# Patient Record
Sex: MALE | Race: WHITE | NOT HISPANIC OR LATINO
[De-identification: names, ages, dates, MRNs, and addresses within clinical notes are randomized per-mention and may not be internally consistent; named-entity substitution may affect disease eponyms.]

---

## 2019-11-13 ENCOUNTER — FORM ENCOUNTER (OUTPATIENT)
Age: 64
End: 2019-11-13

## 2019-11-14 ENCOUNTER — OUTPATIENT (OUTPATIENT)
Dept: OUTPATIENT SERVICES | Facility: HOSPITAL | Age: 64
LOS: 1 days | End: 2019-11-14
Payer: COMMERCIAL

## 2019-11-14 ENCOUNTER — APPOINTMENT (OUTPATIENT)
Dept: ORTHOPEDIC SURGERY | Facility: CLINIC | Age: 64
End: 2019-11-14
Payer: COMMERCIAL

## 2019-11-14 VITALS — WEIGHT: 138 LBS | BODY MASS INDEX: 25.4 KG/M2 | HEIGHT: 62 IN

## 2019-11-14 DIAGNOSIS — E11.9 TYPE 2 DIABETES MELLITUS W/OUT COMPLICATIONS: ICD-10-CM

## 2019-11-14 DIAGNOSIS — I10 ESSENTIAL (PRIMARY) HYPERTENSION: ICD-10-CM

## 2019-11-14 DIAGNOSIS — Z98.89 OTHER SPECIFIED POSTPROCEDURAL STATES: Chronic | ICD-10-CM

## 2019-11-14 PROCEDURE — 99203 OFFICE O/P NEW LOW 30 MIN: CPT

## 2019-11-14 PROCEDURE — 73564 X-RAY EXAM KNEE 4 OR MORE: CPT | Mod: 26,50

## 2019-11-14 PROCEDURE — 73564 X-RAY EXAM KNEE 4 OR MORE: CPT

## 2019-11-14 RX ORDER — METOPROLOL SUCCINATE 25 MG/1
25 TABLET, EXTENDED RELEASE ORAL
Refills: 0 | Status: ACTIVE | COMMUNITY

## 2019-11-14 RX ORDER — LANSOPRAZOLE 30 MG/1
30 TABLET, ORALLY DISINTEGRATING ORAL
Refills: 0 | Status: ACTIVE | COMMUNITY

## 2019-11-14 RX ORDER — AMLODIPINE BESYLATE 10 MG/1
10 TABLET ORAL
Refills: 0 | Status: ACTIVE | COMMUNITY

## 2019-11-14 RX ORDER — ATORVASTATIN CALCIUM 40 MG/1
40 TABLET, FILM COATED ORAL
Refills: 0 | Status: ACTIVE | COMMUNITY

## 2019-11-18 ENCOUNTER — TRANSCRIPTION ENCOUNTER (OUTPATIENT)
Age: 64
End: 2019-11-18

## 2019-11-18 NOTE — END OF VISIT
[FreeTextEntry3] : All medical record entries made by OLIVIA Gillespie, acting as a scribe for this encounter under the direction of Turner Andrea MD . I have reviewed the chart and agree that the record accurately reflects my personal performance of the history, physical exam, assessment and plan. I have also personally directed, reviewed, and agreed with the chart.

## 2019-11-18 NOTE — DISCUSSION/SUMMARY
[de-identified] : Mr. Dominguez has DJD in both knees.  We will order HA injections for him. He understands that he will ultimately require knee replacement in the future. ALl questions were answered. He will call if any issues arise.

## 2019-11-18 NOTE — PHYSICAL EXAM
[de-identified] : The patient is a well developed, well nourished male in no apparent distress. He is alert and oriented X 3 with a pleasant mood and appropriate affect. \par \par On physical examination of the right knee, his ROM is 0-120 degrees. The patient walks with a normal gait and stands in neutral alignment. There is no effusion. No warmth or erythema is noted. The patella is tender to palpation medially and laterally. There is patellofemoral crepitus noted. The apprehension and grind tests are negative. The extensor mechanism is intact. There is no joint line tenderness. The Chauncey sign is absent. The Lachman and pivot shift tests are negative. There is no varus or valgus laxity at 0 or 30 degrees. No posterolateral or anteromedial laxity is noted. No masses are palpable. No other soft tissue or bony tenderness is noted. There is some tenderness noted on palpation of the IT band. Quadriceps weakness is noted. Neurovascular function is intact.  \par \par On physical examination of the left knee,his ROM is 0-115 degrees range of motion. The patient walks with a normal gait and stands in neutral alignment. There is trace effusion. No warmth or erythema is noted. The patella is tender to palpation medially and laterally. There is patellofemoral crepitus noted. The apprehension and grind tests are negative. The extensor mechanism is intact. There is no joint line tenderness. The Chauncey sign is absent. The Lachman and pivot shift tests are negative. There is no varus or valgus laxity at 0 or 30 degrees. No posterolateral or anteromedial laxity is noted. No masses are palpable. No other soft tissue or bony tenderness is noted. There is some tenderness noted on palpation of the IT band. Quadriceps weakness is noted. Neurovascular function is intact.   [de-identified] : Radiographs shows advanced PF DJD in both knees

## 2019-11-18 NOTE — HISTORY OF PRESENT ILLNESS
[de-identified] : Andrew Dominguez is a 63 yo gentleman with ongoing bilateral knee issues for the last several years. Both knees are bothersome but his left knee is particularly symptomatic. He had open knee surgery at the age of 17 to clean out  bone chips from patella. Over the years, he has noted increased pain and stiffness in his left knee. He now has difficulty with stairs and pain when rising from a seated position. He has anterior knee pain in both knees. He has clicking and intermittent swelling in his left knee. He denies any locking or buckling.

## 2019-12-02 ENCOUNTER — RX RENEWAL (OUTPATIENT)
Age: 64
End: 2019-12-02

## 2019-12-13 ENCOUNTER — APPOINTMENT (OUTPATIENT)
Dept: ORTHOPEDIC SURGERY | Facility: CLINIC | Age: 64
End: 2019-12-13
Payer: COMMERCIAL

## 2019-12-13 PROCEDURE — 20610 DRAIN/INJ JOINT/BURSA W/O US: CPT | Mod: 50

## 2020-03-30 NOTE — PROCEDURE
[de-identified] : Indication: Bilateral knee DJD \par \par Under strict sterile technique, both knees were  prepped with Betadine. Using the superolateral approach, with the patient supine, a 4mL injection of Monovisc was administered intra-articularly into each knee . The patient tolerated the procedure well. The patient was instructed to avoid vigorous exercise for 48 hours and will apply ice to the knee for 20 minutes 2-3 times per day if discomfort occurs. Patient will return on an as needed basis. The patient will call if any questions or problems should arise. \par \par [id number]\par 2021-07-31

## 2020-04-25 ENCOUNTER — MESSAGE (OUTPATIENT)
Age: 65
End: 2020-04-25

## 2020-08-12 ENCOUNTER — APPOINTMENT (OUTPATIENT)
Dept: CHRONIC DISEASE MANAGEMENT | Facility: CLINIC | Age: 65
End: 2020-08-12

## 2020-08-12 VITALS — WEIGHT: 130 LBS | BODY MASS INDEX: 23.92 KG/M2 | HEIGHT: 62 IN

## 2020-09-15 ENCOUNTER — APPOINTMENT (OUTPATIENT)
Dept: CHRONIC DISEASE MANAGEMENT | Facility: CLINIC | Age: 65
End: 2020-09-15

## 2020-09-15 VITALS — WEIGHT: 130 LBS | HEIGHT: 62 IN | BODY MASS INDEX: 23.92 KG/M2

## 2020-10-13 ENCOUNTER — APPOINTMENT (OUTPATIENT)
Dept: CHRONIC DISEASE MANAGEMENT | Facility: CLINIC | Age: 65
End: 2020-10-13

## 2020-10-13 VITALS — HEIGHT: 62 IN | WEIGHT: 127 LBS | BODY MASS INDEX: 23.37 KG/M2

## 2020-10-15 ENCOUNTER — APPOINTMENT (OUTPATIENT)
Dept: ORTHOPEDIC SURGERY | Facility: CLINIC | Age: 65
End: 2020-10-15
Payer: COMMERCIAL

## 2020-10-15 VITALS
HEART RATE: 72 BPM | OXYGEN SATURATION: 98 % | SYSTOLIC BLOOD PRESSURE: 110 MMHG | WEIGHT: 127 LBS | TEMPERATURE: 97.8 F | HEIGHT: 62 IN | DIASTOLIC BLOOD PRESSURE: 70 MMHG | BODY MASS INDEX: 23.37 KG/M2

## 2020-10-15 PROCEDURE — 99213 OFFICE O/P EST LOW 20 MIN: CPT

## 2020-10-16 NOTE — HISTORY OF PRESENT ILLNESS
[de-identified] : Andrew returns today for evaluation of both knees. He has had tremendous relief from the HA injections. He was able to walk 6-8 miles per day during the pandemic. He noted some increased pain and stiffness over the last few weeks. He denies any locking or buckling. He also reports some lateral right hip pain which began after lifting something several weeks ago.

## 2020-10-16 NOTE — DISCUSSION/SUMMARY
[de-identified] : Mr. Dominguez has DJD in both knees.  We will order HA injections for him. He understands that he will ultimately require knee replacement in the future. ALl questions were answered. He will call if any issues arise.

## 2020-10-16 NOTE — END OF VISIT
[FreeTextEntry3] : All medical record entries made by OLIVIA Gillespie, acting as a scribe for this encounter under the direction of Turner Andrea MD . I have reviewed the chart and agree that the record accurately reflects my personal performance of the history, physical exam, assessment and plan. I have also personally directed, reviewed, and agreed with the chart

## 2020-10-16 NOTE — PHYSICAL EXAM
[de-identified] : The patient is a well developed, well nourished male in no apparent distress. He is alert and oriented X 3 with a pleasant mood and appropriate affect. \par \par On physical examination of the right knee, his ROM is 0-120 degrees. The patient walks with a normal gait and stands in neutral alignment. There is no effusion. No warmth or erythema is noted. The patella is tender to palpation medially and laterally. There is patellofemoral crepitus noted. The apprehension and grind tests are negative. The extensor mechanism is intact. There is no joint line tenderness. The Chauncey sign is absent. The Lachman and pivot shift tests are negative. There is no varus or valgus laxity at 0 or 30 degrees. No posterolateral or anteromedial laxity is noted. No masses are palpable. No other soft tissue or bony tenderness is noted. There is some tenderness noted on palpation of the IT band and the lateral hip. Quadriceps weakness is noted. Neurovascular function is intact.  \par \par On physical examination of the left knee,his ROM is 0-115 degrees range of motion. The patient walks with a normal gait and stands in neutral alignment. There is trace effusion. No warmth or erythema is noted. The patella is tender to palpation medially and laterally. There is patellofemoral crepitus noted. The apprehension and grind tests are negative. The extensor mechanism is intact. There is no joint line tenderness. The Chauncey sign is absent. The Lachman and pivot shift tests are negative. There is no varus or valgus laxity at 0 or 30 degrees. No posterolateral or anteromedial laxity is noted. No masses are palpable. No other soft tissue or bony tenderness is noted. There is some tenderness noted on palpation of the IT band. Quadriceps weakness is noted. Neurovascular function is intact.

## 2020-11-05 ENCOUNTER — APPOINTMENT (OUTPATIENT)
Dept: ORTHOPEDIC SURGERY | Facility: CLINIC | Age: 65
End: 2020-11-05
Payer: COMMERCIAL

## 2020-11-05 PROCEDURE — 99072 ADDL SUPL MATRL&STAF TM PHE: CPT

## 2020-11-05 PROCEDURE — 20610 DRAIN/INJ JOINT/BURSA W/O US: CPT | Mod: 50

## 2020-11-05 NOTE — PROCEDURE
[de-identified] : CC: bilateral knee pain and stiffness\par DX; Bilateral knee DJD \par \par Under strict sterile technique, the both knees were prepped with Betadine. Using the superolateral approach, with the patient supine, a 4mL injection of Monovisc was administered intra-articularly. The patient tolerated the procedure well. The patient was instructed to avoid vigorous exercise for 48 hours and will apply ice to the knee for 20 minutes 2-3 times per day if discomfort occurs. Patient will return on an as needed basis. The patient will call if any questions or problems should arise. \par \par Lot [id number]\par 12-31-21

## 2020-11-10 ENCOUNTER — APPOINTMENT (OUTPATIENT)
Dept: CHRONIC DISEASE MANAGEMENT | Facility: CLINIC | Age: 65
End: 2020-11-10

## 2020-11-10 ENCOUNTER — NON-APPOINTMENT (OUTPATIENT)
Age: 65
End: 2020-11-10

## 2020-12-09 ENCOUNTER — NON-APPOINTMENT (OUTPATIENT)
Age: 65
End: 2020-12-09

## 2020-12-09 ENCOUNTER — APPOINTMENT (OUTPATIENT)
Dept: CHRONIC DISEASE MANAGEMENT | Facility: CLINIC | Age: 65
End: 2020-12-09

## 2020-12-09 VITALS — HEIGHT: 62 IN | WEIGHT: 127 LBS | BODY MASS INDEX: 23.37 KG/M2

## 2021-01-12 ENCOUNTER — APPOINTMENT (OUTPATIENT)
Dept: CHRONIC DISEASE MANAGEMENT | Facility: CLINIC | Age: 66
End: 2021-01-12

## 2021-01-12 ENCOUNTER — NON-APPOINTMENT (OUTPATIENT)
Age: 66
End: 2021-01-12

## 2021-01-12 VITALS — WEIGHT: 128 LBS | BODY MASS INDEX: 23.55 KG/M2 | HEIGHT: 62 IN

## 2021-02-16 ENCOUNTER — APPOINTMENT (OUTPATIENT)
Dept: CHRONIC DISEASE MANAGEMENT | Facility: CLINIC | Age: 66
End: 2021-02-16

## 2021-02-16 ENCOUNTER — NON-APPOINTMENT (OUTPATIENT)
Age: 66
End: 2021-02-16

## 2021-02-16 VITALS — WEIGHT: 127 LBS | HEIGHT: 62 IN | BODY MASS INDEX: 23.37 KG/M2

## 2021-03-16 ENCOUNTER — APPOINTMENT (OUTPATIENT)
Dept: CHRONIC DISEASE MANAGEMENT | Facility: CLINIC | Age: 66
End: 2021-03-16

## 2021-03-16 ENCOUNTER — NON-APPOINTMENT (OUTPATIENT)
Age: 66
End: 2021-03-16

## 2021-03-16 VITALS — WEIGHT: 127 LBS | BODY MASS INDEX: 23.37 KG/M2 | HEIGHT: 62 IN

## 2021-04-13 ENCOUNTER — APPOINTMENT (OUTPATIENT)
Dept: CHRONIC DISEASE MANAGEMENT | Facility: CLINIC | Age: 66
End: 2021-04-13

## 2021-04-13 ENCOUNTER — NON-APPOINTMENT (OUTPATIENT)
Age: 66
End: 2021-04-13

## 2021-04-13 VITALS — HEIGHT: 62 IN | WEIGHT: 124 LBS | BODY MASS INDEX: 22.82 KG/M2

## 2021-05-11 ENCOUNTER — NON-APPOINTMENT (OUTPATIENT)
Age: 66
End: 2021-05-11

## 2021-05-11 VITALS — WEIGHT: 124 LBS | HEIGHT: 62 IN | BODY MASS INDEX: 22.82 KG/M2

## 2021-08-19 ENCOUNTER — NON-APPOINTMENT (OUTPATIENT)
Age: 66
End: 2021-08-19

## 2021-09-09 ENCOUNTER — RESULT REVIEW (OUTPATIENT)
Age: 66
End: 2021-09-09

## 2021-09-09 ENCOUNTER — OUTPATIENT (OUTPATIENT)
Dept: OUTPATIENT SERVICES | Facility: HOSPITAL | Age: 66
LOS: 1 days | End: 2021-09-09
Payer: COMMERCIAL

## 2021-09-09 ENCOUNTER — APPOINTMENT (OUTPATIENT)
Dept: ORTHOPEDIC SURGERY | Facility: CLINIC | Age: 66
End: 2021-09-09
Payer: COMMERCIAL

## 2021-09-09 VITALS
BODY MASS INDEX: 22.82 KG/M2 | HEIGHT: 62 IN | WEIGHT: 124 LBS | HEART RATE: 70 BPM | SYSTOLIC BLOOD PRESSURE: 110 MMHG | OXYGEN SATURATION: 98 % | DIASTOLIC BLOOD PRESSURE: 80 MMHG

## 2021-09-09 DIAGNOSIS — S76.111A STRAIN OF RIGHT QUADRICEPS MUSCLE, FASCIA AND TENDON, INITIAL ENCOUNTER: ICD-10-CM

## 2021-09-09 DIAGNOSIS — Z98.89 OTHER SPECIFIED POSTPROCEDURAL STATES: Chronic | ICD-10-CM

## 2021-09-09 PROCEDURE — 73552 X-RAY EXAM OF FEMUR 2/>: CPT

## 2021-09-09 PROCEDURE — 99213 OFFICE O/P EST LOW 20 MIN: CPT

## 2021-09-09 PROCEDURE — 73501 X-RAY EXAM HIP UNI 1 VIEW: CPT

## 2021-09-09 PROCEDURE — 73552 X-RAY EXAM OF FEMUR 2/>: CPT | Mod: 26,RT

## 2021-09-09 PROCEDURE — 73501 X-RAY EXAM HIP UNI 1 VIEW: CPT | Mod: 26,LT

## 2021-09-13 NOTE — END OF VISIT
[FreeTextEntry3] : All medical record entries made by OLIVIA Gillespie, acting as a scribe for this encounter under the direction of Turner Andrea MD . I have reviewed the chart and agree that the record accurately reflects my personal performance of the history, physical exam, assessment and plan. I have also personally directed, reviewed, and agreed with the chart. \par \par

## 2021-09-13 NOTE — PHYSICAL EXAM
[de-identified] : The patient is a well developed, well nourished male in no apparent distress. He is alert and oriented X 3 with a pleasant mood and appropriate affect. \par \par On physical examination of the right knee, his ROM is 0-120 degrees. The patient walks with a normal gait and stands in neutral alignment. There is no effusion. No warmth or erythema is noted. The patella is tender to palpation medially and laterally. There is patellofemoral crepitus noted. The apprehension and grind tests are negative. The extensor mechanism is intact. There is no joint line tenderness. The Chauncey sign is absent. The Lachman and pivot shift tests are negative. There is no varus or valgus laxity at 0 or 30 degrees. No posterolateral or anteromedial laxity is noted. No masses are palpable. No other soft tissue or bony tenderness is noted. There is some tenderness noted on palpation of the IT band and the lateral hip. Quad tightness is noted. Quadriceps weakness is noted. Neurovascular function is intact.  \par \par On physical examination of the left knee,his ROM is 0-115 degrees range of motion. The patient walks with a normal gait and stands in neutral alignment. There is trace effusion. No warmth or erythema is noted. The patella is tender to palpation medially and laterally. There is patellofemoral crepitus noted. The apprehension and grind tests are negative. The extensor mechanism is intact. There is no joint line tenderness. The Chauncey sign is absent. The Lachman and pivot shift tests are negative. There is no varus or valgus laxity at 0 or 30 degrees. No posterolateral or anteromedial laxity is noted. No masses are palpable. No other soft tissue or bony tenderness is noted. There is some tenderness noted on palpation of the IT band. Quadriceps weakness is noted. Neurovascular function is intact.   [de-identified] : Radiographs of both hips show mild DJD \par Radiographs of the femur show no bony abnormalities.

## 2021-09-13 NOTE — HISTORY OF PRESENT ILLNESS
[de-identified] : Andrew returns today for evaluation of his right leg. he reports a recent onset of anterior thigh pain and stiffness. He continues to walk 10,000 steps per day but now with some discomfort. He denies any trauma. he has a h.o DJD in both knees and has managed well in the past with HA Injections.

## 2021-09-13 NOTE — DISCUSSION/SUMMARY
[de-identified] : Andrew has a right quad strain. he will begin a course of supervised PT. we will order HA injections for both knees. He will continue on with activities as tolerated. All questions were answered. He will call if any issues arise.

## 2021-09-30 ENCOUNTER — NON-APPOINTMENT (OUTPATIENT)
Age: 66
End: 2021-09-30

## 2021-09-30 VITALS — WEIGHT: 124 LBS | BODY MASS INDEX: 22.82 KG/M2 | HEIGHT: 62 IN

## 2021-11-01 ENCOUNTER — APPOINTMENT (OUTPATIENT)
Dept: ORTHOPEDIC SURGERY | Facility: CLINIC | Age: 66
End: 2021-11-01
Payer: COMMERCIAL

## 2021-11-01 PROCEDURE — 20610 DRAIN/INJ JOINT/BURSA W/O US: CPT | Mod: 50

## 2021-11-01 NOTE — PROCEDURE
[de-identified] : cc: bilateral knee pain and stiffness\par DX: bilateral knee DJD \par \par Under strict sterile technique, both knees were prepped with Betadine. Using the superolateral approach, with the patient supine, a 4mL injection of Monovisc was administered intra-articularly into each knee. The patient tolerated the procedure well. The patient was instructed to avoid vigorous exercise for 48 hours and will apply ice to the knee for 20 minutes 2-3 times per day if discomfort occurs. Patient will return on an as needed basis. The patient will call if any questions or problems should arise. \par \par Lot [id number]\par 2023-12-31\par \par

## 2021-11-16 ENCOUNTER — APPOINTMENT (OUTPATIENT)
Dept: ORTHOPEDIC SURGERY | Facility: CLINIC | Age: 66
End: 2021-11-16
Payer: COMMERCIAL

## 2021-11-16 VITALS — BODY MASS INDEX: 22.82 KG/M2 | HEIGHT: 62 IN | WEIGHT: 124 LBS

## 2021-11-16 PROCEDURE — 99214 OFFICE O/P EST MOD 30 MIN: CPT

## 2021-11-16 RX ORDER — FAMOTIDINE 40 MG/1
40 TABLET, FILM COATED ORAL
Refills: 0 | Status: ACTIVE | COMMUNITY

## 2021-11-16 NOTE — PHYSICAL EXAM
[de-identified] : General appearance: well nourished and hydrated, pleasant, alert and oriented x 3, cooperative.  \par HEENT: normocephalic, EOM intact, wearing mask, external auditory canal clear.  \par Cardiovascular: no lower leg edema, no varicosities, dorsalis pedis pulses palpable and symmetric.  \par Lymphatics: no palpable lymphadenopathy, no lymphedema.  \par Neurologic: sensation is normal, no muscle weakness in upper or lower extremities, patella tendon reflexes present and symmetric.  \par Dermatologic: skin moist, warm, no rash.  \par Spine: cervical spine with normal lordosis and painless range of motion, thoracic spine with normal kyphosis and painless range of motion, lumbosacral spine with normal lordosis and painless range of motion.  \par Gait: normal.  \par \par Limb lengths: similar\par \par Right hip:\par - Swelling: none\par - Ecchymosis: none\par - Erythema: none\par - Wounds: none\par - Tenderness: none\par - ROM: 110 flexion, 0 extension, 10 adduction, 40 abduction, 5 internal rotation, 60 external rotation\par - CELIA: painful\par - FADIR: painful\par - Belle: negative\par - Stinchfield: positive\par - Flexor power: 5/5\par - Abductor power: 5/5 [de-identified] : Pelvis and left hip XRs taken on 9/9/21 at  [facility] were interpreted by me, and reviewed with the patient.\par \par Pelvic alignment: normal\par \par Right hip --\par Alignment: coxa vara\par Arthritis: severe, nearly bone on bone central\par Deformity: none\par Osteonecrosis: secondary sclerotic and cystic change\par \par Left hip --\par Alignment: coxa vara\par Arthritis: moderate central pattern\par Deformity: none\par Osteonecrosis: secondary sclerotic and cystic change

## 2021-11-16 NOTE — DISCUSSION/SUMMARY
[de-identified] : 65y/o male with right > left hip osteoarthritis\par - He is indicated for right total hip arthroplasty\par - We discussed the details of the procedure, the expected recovery period, and the expected outcome. We discussed the likelihood of satisfaction after complete recovery, and the potential causes of dissatisfaction. The importance of active patient participation in the rehabilitation protocol was emphasized, along with its influence on short and long-term outcomes. We discussed the risks, benefits, and alternatives of surgery at length. Specific risks of total hip replacement were discussed in detail. We discussed the risk of surgical site complications including but not limited to: surgical site infection, wound healing complications, bone fracture, tendon or ligament injury, neurovascular injury, hemorrhage, postoperative stiffness or instability, persistent pain, limb length discrepancy, and need for reoperation. We discussed surgical blood loss and the possible need for blood transfusion. We discussed the risk of perioperative medical complications, including but not limited to catheter-associated urinary tract infection, venous thromboembolism and other cardiopulmonary complications. We discussed anesthetic options and the risk of anesthesia-related complications. We discussed the potential benefits of surgery including the potential to improve the current clinical condition through operative intervention. I emphasized that there are alternatives to surgical intervention including continued conservative management, though such a course could yield less than optimal results in this particular patient. A model was used to demonstrate the operation and to discuss bearing surfaces of the implants. We discussed implant fixation methods; my plan would be to use fully cementless fixation in this case. We discussed the various surgical approaches to the hip; I think that an anterior approach would be appropriate in this case. We discussed the durability of prosthetic hips and limitations related to wear, osteolysis and loosening. All questions were answered to the patient's satisfaction. The patient was given a copy of my preoperative packet with additional information about the procedure. I asked the patient to either call back or schedule a followup appointment for any additional questions or concerns regarding the procedure.\par - He will discuss with his wife and try another course of conservative management before committing to surgery\par - Cont PT\par - HEP\par - Tylenol + meloxicam as needed\par - Discussed CSI but he declined; his cardiologist recommended avoiding corticosteroid in general\par - RTC 2mo, no new XRs needed. Consider booking R NUSRAT at that time pending clinical progress

## 2021-11-16 NOTE — HISTORY OF PRESENT ILLNESS
[___ mths] : [unfilled] month(s) ago [3] : a current pain level of 3/10 [Constant] : ~He/She~ states the symptoms seem to be constant [de-identified] : 11/16/21: 65y/o male p/w R >> L hip pain progressive for about 3 months. No injury or other inciting event. Pain localizes to the groin with radiation down the medial thigh and around the lateral hip. Worst exacerbating activity is rising from seated position. Walking is not too bad after getting started; today he walked here from [facility] without too much difficulty. Started PT in September and hasn't found it particularly helpful. Takes no pain medications. \par \par PMH sig for HTN, CAD s/p MI in 2010 managed with 1x stent, DM (last known A1c 5.9). 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 [None] : No relieving factors are noted [de-identified] : patient describes pain as radiating, sharp and achy [de-identified] : standing up

## 2021-12-07 ENCOUNTER — APPOINTMENT (OUTPATIENT)
Dept: ORTHOPEDIC SURGERY | Facility: CLINIC | Age: 66
End: 2021-12-07
Payer: COMMERCIAL

## 2021-12-07 PROCEDURE — 99214 OFFICE O/P EST MOD 30 MIN: CPT

## 2021-12-07 NOTE — HISTORY OF PRESENT ILLNESS
[de-identified] : 12/7/21: Returning with wife for re-evaluation. No improvement in right hip symptoms with PT. Cardiologist forbade NSAIDs, more due to CKD than CAD. CKD workup is ongoing; no identifiable cause noted so far. They report generally poor function with daily steps now around 3k, sharp spikes of pain somewhat unpredictably, and overall unacceptable quality of life. Would like to book NUSRAT as soon as possible.\par \par 11/16/21: 67y/o male p/w R >> L hip pain progressive for about 3 months. No injury or other inciting event. Pain localizes to the groin with radiation down the medial thigh and around the lateral hip. Worst exacerbating activity is rising from seated position. Walking is not too bad after getting started; today he walked here from [facility] without too much difficulty. Started PT in September and hasn't found it particularly helpful. Takes no pain medications. \par \par PMH sig for HTN, CAD s/p MI in 2010 managed with 1x stent, DM (last known A1c 5.9). 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

## 2021-12-07 NOTE — DISCUSSION/SUMMARY
[de-identified] : 65y/o male with right > left hip osteoarthritis\par - He is indicated for right total hip arthroplasty\par - We discussed the details of the procedure, the expected recovery period, and the expected outcome. We discussed the likelihood of satisfaction after complete recovery, and the potential causes of dissatisfaction. The importance of active patient participation in the rehabilitation protocol was emphasized, along with its influence on short and long-term outcomes. We discussed the risks, benefits, and alternatives of surgery at length. Specific risks of total hip replacement were discussed in detail. We discussed the risk of surgical site complications including but not limited to: surgical site infection, wound healing complications, bone fracture, tendon or ligament injury, neurovascular injury, hemorrhage, postoperative stiffness or instability, persistent pain, limb length discrepancy, and need for reoperation. We discussed surgical blood loss and the possible need for blood transfusion. We discussed the risk of perioperative medical complications, including but not limited to catheter-associated urinary tract infection, venous thromboembolism and other cardiopulmonary complications. We discussed anesthetic options and the risk of anesthesia-related complications. We discussed the potential benefits of surgery including the potential to improve the current clinical condition through operative intervention. I emphasized that there are alternatives to surgical intervention including continued conservative management, though such a course could yield less than optimal results in this particular patient. A model was used to demonstrate the operation and to discuss bearing surfaces of the implants. We discussed implant fixation methods; my plan would be to use fully cementless fixation in this case. We discussed the various surgical approaches to the hip; I think that an anterior approach would be appropriate in this case. We discussed the durability of prosthetic hips and limitations related to wear, osteolysis and loosening. All questions were answered to the patient's satisfaction. The patient was given a copy of my preoperative packet with additional information about the procedure. I asked the patient to either call back or schedule a followup appointment for any additional questions or concerns regarding the procedure.\par - Book for a convenient time, ASAP per their preference\par - Med/cardiac clearance through PCP\par - No NSAIDs perioperatively

## 2021-12-07 NOTE — PHYSICAL EXAM
[de-identified] : General appearance: well nourished and hydrated, pleasant, alert and oriented x 3, cooperative.  \par HEENT: normocephalic, EOM intact, wearing mask, external auditory canal clear.  \par Cardiovascular: no lower leg edema, no varicosities, dorsalis pedis pulses palpable and symmetric.  \par Lymphatics: no palpable lymphadenopathy, no lymphedema.  \par Neurologic: sensation is normal, no muscle weakness in upper or lower extremities, patella tendon reflexes present and symmetric.  \par Dermatologic: skin moist, warm, no rash.  \par Spine: cervical spine with normal lordosis and painless range of motion, thoracic spine with normal kyphosis and painless range of motion, lumbosacral spine with normal lordosis and painless range of motion.  \par Gait: normal.  \par \par Limb lengths: similar\par \par Right hip:\par - Swelling: none\par - Ecchymosis: none\par - Erythema: none\par - Wounds: none\par - Tenderness: none\par - ROM: 110 flexion, 0 extension, 10 adduction, 40 abduction, 5 internal rotation, 60 external rotation\par - CELIA: painful\par - FADIR: painful\par - Belle: negative\par - Stinchfield: positive\par - Flexor power: 5/5\par - Abductor power: 5/5

## 2021-12-13 ENCOUNTER — LABORATORY RESULT (OUTPATIENT)
Age: 66
End: 2021-12-13

## 2021-12-15 ENCOUNTER — TRANSCRIPTION ENCOUNTER (OUTPATIENT)
Age: 66
End: 2021-12-15

## 2021-12-15 VITALS
TEMPERATURE: 98 F | RESPIRATION RATE: 18 BRPM | SYSTOLIC BLOOD PRESSURE: 145 MMHG | HEART RATE: 70 BPM | WEIGHT: 128.75 LBS | OXYGEN SATURATION: 98 % | DIASTOLIC BLOOD PRESSURE: 77 MMHG | HEIGHT: 62 IN

## 2021-12-15 RX ORDER — POVIDONE-IODINE 5 %
1 AEROSOL (ML) TOPICAL ONCE
Refills: 0 | Status: COMPLETED | OUTPATIENT
Start: 2021-12-16 | End: 2021-12-16

## 2021-12-15 RX ORDER — CHLORHEXIDINE GLUCONATE 213 G/1000ML
1 SOLUTION TOPICAL EVERY 12 HOURS
Refills: 0 | Status: COMPLETED | OUTPATIENT
Start: 2021-12-16 | End: 2021-12-17

## 2021-12-15 NOTE — H&P ADULT - NSHPLABSRESULTS_GEN_ALL_CORE
Preop CBC, BMP, PT/PTT/INR, UA within normal limits- reviewed by medical clearance.   Cr 1.8: preop   Echo: 60-65% EF  COVID negative 12/13   CXR: Within normal limits, per medical clearance.

## 2021-12-15 NOTE — H&P ADULT - PROBLEM SELECTOR PLAN 1
Admit to Orthopaedic Service.  Presents today for elective right NUSRAT   Pt medically stable and cleared for procedure today by Dr. Chandra  # Hx CAD  Continue outpatient treatment regimen   #Hx HTN  Continue outpatient treatment regimen   # Hx GERD  Continue outpatient treatment regimen   # PTCA  Continue outpatient treatment regimen   # IBS   Continue outpatient treatment regimen   # Colon Polyp  Continue outpatient treatment regimen   # HLD- Continue outpatient treatment regimen   # Hx Retinal detachment  # DM2- ISS, consistent carb diet, will monitor FS   # MI- Continue outpatient treatment and monitor

## 2021-12-15 NOTE — PRE-OP CHECKLIST - 1.
patient has cardiac stentx1. the surgeon instructed patient to take Baby [id number] today and not tomorrow. patient has cardiac stentx1. the surgeon instructed patient to take Baby [id number] today 12/15 and not tomorrow 12/16

## 2021-12-15 NOTE — H&P ADULT - HISTORY OF PRESENT ILLNESS
66M with right hip pain x    Presents today for right NUSRAT.  66M with right hip pain radiating to his thigh x 6 months.  Pt states pain began slowly and progressed denying any precipitating event. Pt takes no medications for the pain . Pt ambulates independently without assistive device. Pt has attempted and failed conservative treatment for his right hip pain consisting of PT.  Pt has history of 1x cardiac stent for which he takes daily asa. Pt denies numbness and tingling down lower extremities b/l, fever, chills, recent illness, CP, SOB, N/V, or any other complaints.     Presents today for right NUSRAT.

## 2021-12-15 NOTE — H&P ADULT - NSHPPHYSICALEXAM_GEN_ALL_CORE
PE: Decreased ROM to right hip pain       Rest of PE per medical clearance General: Alert and oriented, NAD  MSK:  Decreased ROM of right hip secondary to pain.  EHL/FHL/TA/Gastro 5/5 b/l   DP's palpable   Gross sensation to light touch intact throughout lower extremities b/l       Rest of PE per medical clearance

## 2021-12-15 NOTE — PATIENT PROFILE ADULT - FALL HARM RISK - UNIVERSAL INTERVENTIONS
Bed in lowest position, wheels locked, appropriate side rails in place/Call bell, personal items and telephone in reach/Instruct patient to call for assistance before getting out of bed or chair/Non-slip footwear when patient is out of bed/[place] to call system/Physically safe environment - no spills, clutter or unnecessary equipment/Purposeful Proactive Rounding/Room/bathroom lighting operational, light cord in reach

## 2021-12-16 ENCOUNTER — NON-APPOINTMENT (OUTPATIENT)
Age: 66
End: 2021-12-16

## 2021-12-16 ENCOUNTER — TRANSCRIPTION ENCOUNTER (OUTPATIENT)
Age: 66
End: 2021-12-16

## 2021-12-16 ENCOUNTER — INPATIENT (INPATIENT)
Facility: HOSPITAL | Age: 66
LOS: 2 days | Discharge: HOME | DRG: 470 | End: 2021-12-19
Attending: ORTHOPAEDIC SURGERY | Admitting: ORTHOPAEDIC SURGERY
Payer: COMMERCIAL

## 2021-12-16 ENCOUNTER — APPOINTMENT (OUTPATIENT)
Dept: ORTHOPEDIC SURGERY | Facility: HOSPITAL | Age: 66
End: 2021-12-16

## 2021-12-16 DIAGNOSIS — Z98.890 OTHER SPECIFIED POSTPROCEDURAL STATES: Chronic | ICD-10-CM

## 2021-12-16 DIAGNOSIS — M16.11 UNILATERAL PRIMARY OSTEOARTHRITIS, RIGHT HIP: ICD-10-CM

## 2021-12-16 DIAGNOSIS — Z98.89 OTHER SPECIFIED POSTPROCEDURAL STATES: Chronic | ICD-10-CM

## 2021-12-16 DIAGNOSIS — Z95.5 PRESENCE OF CORONARY ANGIOPLASTY IMPLANT AND GRAFT: Chronic | ICD-10-CM

## 2021-12-16 DIAGNOSIS — Q30.9 CONGENITAL MALFORMATION OF NOSE, UNSPECIFIED: Chronic | ICD-10-CM

## 2021-12-16 LAB
GLUCOSE BLDC GLUCOMTR-MCNC: 134 MG/DL — HIGH (ref 70–99)
GLUCOSE BLDC GLUCOMTR-MCNC: 153 MG/DL — HIGH (ref 70–99)
GLUCOSE BLDC GLUCOMTR-MCNC: 183 MG/DL — HIGH (ref 70–99)
GLUCOSE BLDC GLUCOMTR-MCNC: 196 MG/DL — HIGH (ref 70–99)

## 2021-12-16 PROCEDURE — 27130 TOTAL HIP ARTHROPLASTY: CPT | Mod: RT

## 2021-12-16 PROCEDURE — 72170 X-RAY EXAM OF PELVIS: CPT | Mod: 26

## 2021-12-16 RX ORDER — METFORMIN HYDROCHLORIDE 850 MG/1
500 TABLET ORAL
Refills: 0 | Status: DISCONTINUED | OUTPATIENT
Start: 2021-12-16 | End: 2021-12-19

## 2021-12-16 RX ORDER — FAMOTIDINE 10 MG/ML
40 INJECTION INTRAVENOUS AT BEDTIME
Refills: 0 | Status: DISCONTINUED | OUTPATIENT
Start: 2021-12-16 | End: 2021-12-19

## 2021-12-16 RX ORDER — AMLODIPINE BESYLATE 2.5 MG/1
10 TABLET ORAL DAILY
Refills: 0 | Status: DISCONTINUED | OUTPATIENT
Start: 2021-12-16 | End: 2021-12-19

## 2021-12-16 RX ORDER — ACETAMINOPHEN 500 MG
650 TABLET ORAL EVERY 6 HOURS
Refills: 0 | Status: DISCONTINUED | OUTPATIENT
Start: 2021-12-16 | End: 2021-12-19

## 2021-12-16 RX ORDER — HYDROMORPHONE HYDROCHLORIDE 2 MG/ML
0.5 INJECTION INTRAMUSCULAR; INTRAVENOUS; SUBCUTANEOUS EVERY 4 HOURS
Refills: 0 | Status: COMPLETED | OUTPATIENT
Start: 2021-12-16 | End: 2021-12-23

## 2021-12-16 RX ORDER — AMLODIPINE BESYLATE 2.5 MG/1
1 TABLET ORAL
Qty: 0 | Refills: 0 | DISCHARGE

## 2021-12-16 RX ORDER — CEFAZOLIN SODIUM 1 G
2000 VIAL (EA) INJECTION EVERY 8 HOURS
Refills: 0 | Status: COMPLETED | OUTPATIENT
Start: 2021-12-16 | End: 2021-12-17

## 2021-12-16 RX ORDER — DEXTROSE 50 % IN WATER 50 %
25 SYRINGE (ML) INTRAVENOUS ONCE
Refills: 0 | Status: DISCONTINUED | OUTPATIENT
Start: 2021-12-16 | End: 2021-12-19

## 2021-12-16 RX ORDER — HYDROMORPHONE HYDROCHLORIDE 2 MG/ML
0.5 INJECTION INTRAMUSCULAR; INTRAVENOUS; SUBCUTANEOUS
Refills: 0 | Status: DISCONTINUED | OUTPATIENT
Start: 2021-12-16 | End: 2021-12-19

## 2021-12-16 RX ORDER — ACETAMINOPHEN 500 MG/1
500 TABLET ORAL
Qty: 180 | Refills: 2 | Status: ACTIVE | COMMUNITY
Start: 2021-12-16 | End: 1900-01-01

## 2021-12-16 RX ORDER — DEXTROSE 50 % IN WATER 50 %
15 SYRINGE (ML) INTRAVENOUS ONCE
Refills: 0 | Status: DISCONTINUED | OUTPATIENT
Start: 2021-12-16 | End: 2021-12-19

## 2021-12-16 RX ORDER — FAMOTIDINE 10 MG/ML
1 INJECTION INTRAVENOUS
Qty: 0 | Refills: 0 | DISCHARGE

## 2021-12-16 RX ORDER — ACETAMINOPHEN 500 MG
1000 TABLET ORAL ONCE
Refills: 0 | Status: COMPLETED | OUTPATIENT
Start: 2021-12-16 | End: 2021-12-16

## 2021-12-16 RX ORDER — SODIUM CHLORIDE 9 MG/ML
1000 INJECTION, SOLUTION INTRAVENOUS
Refills: 0 | Status: DISCONTINUED | OUTPATIENT
Start: 2021-12-16 | End: 2021-12-19

## 2021-12-16 RX ORDER — ATORVASTATIN CALCIUM 80 MG/1
40 TABLET, FILM COATED ORAL AT BEDTIME
Refills: 0 | Status: DISCONTINUED | OUTPATIENT
Start: 2021-12-16 | End: 2021-12-19

## 2021-12-16 RX ORDER — CELECOXIB 200 MG/1
400 CAPSULE ORAL ONCE
Refills: 0 | Status: DISCONTINUED | OUTPATIENT
Start: 2021-12-16 | End: 2021-12-16

## 2021-12-16 RX ORDER — OXYCODONE HYDROCHLORIDE 5 MG/1
10 TABLET ORAL
Refills: 0 | Status: DISCONTINUED | OUTPATIENT
Start: 2021-12-16 | End: 2021-12-19

## 2021-12-16 RX ORDER — SODIUM CHLORIDE 9 MG/ML
1000 INJECTION, SOLUTION INTRAVENOUS
Refills: 0 | Status: DISCONTINUED | OUTPATIENT
Start: 2021-12-17 | End: 2021-12-19

## 2021-12-16 RX ORDER — ONDANSETRON 8 MG/1
4 TABLET, FILM COATED ORAL EVERY 6 HOURS
Refills: 0 | Status: DISCONTINUED | OUTPATIENT
Start: 2021-12-16 | End: 2021-12-19

## 2021-12-16 RX ORDER — METOPROLOL TARTRATE 50 MG
25 TABLET ORAL DAILY
Refills: 0 | Status: DISCONTINUED | OUTPATIENT
Start: 2021-12-16 | End: 2021-12-19

## 2021-12-16 RX ORDER — LANSOPRAZOLE 15 MG/1
1 CAPSULE, DELAYED RELEASE ORAL
Qty: 0 | Refills: 0 | DISCHARGE

## 2021-12-16 RX ORDER — CHOLECALCIFEROL (VITAMIN D3) 125 MCG
1 CAPSULE ORAL
Qty: 0 | Refills: 0 | DISCHARGE

## 2021-12-16 RX ORDER — CHOLECALCIFEROL (VITAMIN D3) 125 MCG
1000 CAPSULE ORAL DAILY
Refills: 0 | Status: DISCONTINUED | OUTPATIENT
Start: 2021-12-16 | End: 2021-12-19

## 2021-12-16 RX ORDER — POLYETHYLENE GLYCOL 3350 17 G/17G
17 POWDER, FOR SOLUTION ORAL AT BEDTIME
Refills: 0 | Status: DISCONTINUED | OUTPATIENT
Start: 2021-12-16 | End: 2021-12-19

## 2021-12-16 RX ORDER — APREPITANT 80 MG/1
40 CAPSULE ORAL ONCE
Refills: 0 | Status: COMPLETED | OUTPATIENT
Start: 2021-12-16 | End: 2021-12-16

## 2021-12-16 RX ORDER — INSULIN LISPRO 100/ML
VIAL (ML) SUBCUTANEOUS
Refills: 0 | Status: DISCONTINUED | OUTPATIENT
Start: 2021-12-16 | End: 2021-12-19

## 2021-12-16 RX ORDER — ASPIRIN/CALCIUM CARB/MAGNESIUM 324 MG
162 TABLET ORAL ONCE
Refills: 0 | Status: COMPLETED | OUTPATIENT
Start: 2021-12-16 | End: 2021-12-16

## 2021-12-16 RX ORDER — SCOPALAMINE 1 MG/3D
1 PATCH, EXTENDED RELEASE TRANSDERMAL ONCE
Refills: 0 | Status: COMPLETED | OUTPATIENT
Start: 2021-12-16 | End: 2021-12-16

## 2021-12-16 RX ORDER — DEXTROSE 50 % IN WATER 50 %
12.5 SYRINGE (ML) INTRAVENOUS ONCE
Refills: 0 | Status: DISCONTINUED | OUTPATIENT
Start: 2021-12-16 | End: 2021-12-19

## 2021-12-16 RX ORDER — MAGNESIUM HYDROXIDE 400 MG/1
30 TABLET, CHEWABLE ORAL DAILY
Refills: 0 | Status: DISCONTINUED | OUTPATIENT
Start: 2021-12-16 | End: 2021-12-19

## 2021-12-16 RX ORDER — SENNA PLUS 8.6 MG/1
2 TABLET ORAL AT BEDTIME
Refills: 0 | Status: DISCONTINUED | OUTPATIENT
Start: 2021-12-16 | End: 2021-12-19

## 2021-12-16 RX ORDER — SITAGLIPTIN AND METFORMIN HYDROCHLORIDE 500; 50 MG/1; MG/1
1 TABLET, FILM COATED ORAL
Qty: 0 | Refills: 0 | DISCHARGE

## 2021-12-16 RX ORDER — ASPIRIN/CALCIUM CARB/MAGNESIUM 324 MG
81 TABLET ORAL
Refills: 0 | Status: DISCONTINUED | OUTPATIENT
Start: 2021-12-16 | End: 2021-12-19

## 2021-12-16 RX ORDER — HYDROMORPHONE HYDROCHLORIDE 2 MG/ML
0.5 INJECTION INTRAMUSCULAR; INTRAVENOUS; SUBCUTANEOUS EVERY 4 HOURS
Refills: 0 | Status: DISCONTINUED | OUTPATIENT
Start: 2021-12-16 | End: 2021-12-19

## 2021-12-16 RX ORDER — GLUCAGON INJECTION, SOLUTION 0.5 MG/.1ML
1 INJECTION, SOLUTION SUBCUTANEOUS ONCE
Refills: 0 | Status: DISCONTINUED | OUTPATIENT
Start: 2021-12-16 | End: 2021-12-19

## 2021-12-16 RX ORDER — MORPHINE SULFATE 50 MG/1
2 CAPSULE, EXTENDED RELEASE ORAL
Refills: 0 | Status: DISCONTINUED | OUTPATIENT
Start: 2021-12-16 | End: 2021-12-16

## 2021-12-16 RX ORDER — BUPIVACAINE 13.3 MG/ML
20 INJECTION, SUSPENSION, LIPOSOMAL INFILTRATION ONCE
Refills: 0 | Status: DISCONTINUED | OUTPATIENT
Start: 2021-12-16 | End: 2021-12-19

## 2021-12-16 RX ORDER — OXYCODONE HYDROCHLORIDE 5 MG/1
5 TABLET ORAL
Refills: 0 | Status: DISCONTINUED | OUTPATIENT
Start: 2021-12-16 | End: 2021-12-19

## 2021-12-16 RX ORDER — ATORVASTATIN CALCIUM 80 MG/1
1 TABLET, FILM COATED ORAL
Qty: 0 | Refills: 0 | DISCHARGE

## 2021-12-16 RX ORDER — PANTOPRAZOLE SODIUM 20 MG/1
40 TABLET, DELAYED RELEASE ORAL
Refills: 0 | Status: DISCONTINUED | OUTPATIENT
Start: 2021-12-16 | End: 2021-12-16

## 2021-12-16 RX ORDER — ACETAMINOPHEN 500 MG
650 TABLET ORAL EVERY 6 HOURS
Refills: 0 | Status: DISCONTINUED | OUTPATIENT
Start: 2021-12-16 | End: 2021-12-16

## 2021-12-16 RX ORDER — METOCLOPRAMIDE HCL 10 MG
10 TABLET ORAL EVERY 8 HOURS
Refills: 0 | Status: DISCONTINUED | OUTPATIENT
Start: 2021-12-16 | End: 2021-12-19

## 2021-12-16 RX ORDER — METOPROLOL TARTRATE 50 MG
1 TABLET ORAL
Qty: 0 | Refills: 0 | DISCHARGE

## 2021-12-16 RX ADMIN — SCOPALAMINE 1 PATCH: 1 PATCH, EXTENDED RELEASE TRANSDERMAL at 19:20

## 2021-12-16 RX ADMIN — MORPHINE SULFATE 2 MILLIGRAM(S): 50 CAPSULE, EXTENDED RELEASE ORAL at 15:04

## 2021-12-16 RX ADMIN — APREPITANT 40 MILLIGRAM(S): 80 CAPSULE ORAL at 08:55

## 2021-12-16 RX ADMIN — Medication 1: at 16:36

## 2021-12-16 RX ADMIN — HYDROMORPHONE HYDROCHLORIDE 0.5 MILLIGRAM(S): 2 INJECTION INTRAMUSCULAR; INTRAVENOUS; SUBCUTANEOUS at 21:30

## 2021-12-16 RX ADMIN — Medication 1: at 21:52

## 2021-12-16 RX ADMIN — ONDANSETRON 4 MILLIGRAM(S): 8 TABLET, FILM COATED ORAL at 14:40

## 2021-12-16 RX ADMIN — Medication 162 MILLIGRAM(S): at 08:54

## 2021-12-16 RX ADMIN — Medication 400 MILLIGRAM(S): at 15:27

## 2021-12-16 RX ADMIN — SCOPALAMINE 1 PATCH: 1 PATCH, EXTENDED RELEASE TRANSDERMAL at 18:18

## 2021-12-16 RX ADMIN — MORPHINE SULFATE 2 MILLIGRAM(S): 50 CAPSULE, EXTENDED RELEASE ORAL at 14:45

## 2021-12-16 RX ADMIN — HYDROMORPHONE HYDROCHLORIDE 0.5 MILLIGRAM(S): 2 INJECTION INTRAMUSCULAR; INTRAVENOUS; SUBCUTANEOUS at 15:36

## 2021-12-16 RX ADMIN — Medication 1000 MILLIGRAM(S): at 08:54

## 2021-12-16 RX ADMIN — CHLORHEXIDINE GLUCONATE 1 APPLICATION(S): 213 SOLUTION TOPICAL at 08:38

## 2021-12-16 RX ADMIN — HYDROMORPHONE HYDROCHLORIDE 0.5 MILLIGRAM(S): 2 INJECTION INTRAMUSCULAR; INTRAVENOUS; SUBCUTANEOUS at 15:51

## 2021-12-16 RX ADMIN — ONDANSETRON 4 MILLIGRAM(S): 8 TABLET, FILM COATED ORAL at 21:15

## 2021-12-16 RX ADMIN — Medication 1 APPLICATION(S): at 08:38

## 2021-12-16 RX ADMIN — Medication 100 MILLIGRAM(S): at 18:13

## 2021-12-16 RX ADMIN — Medication 10 MILLIGRAM(S): at 17:32

## 2021-12-16 RX ADMIN — Medication 1000 MILLIGRAM(S): at 15:52

## 2021-12-16 RX ADMIN — HYDROMORPHONE HYDROCHLORIDE 0.5 MILLIGRAM(S): 2 INJECTION INTRAMUSCULAR; INTRAVENOUS; SUBCUTANEOUS at 21:15

## 2021-12-16 NOTE — DISCHARGE NOTE PROVIDER - NSDCMRMEDTOKEN_GEN_ALL_CORE_FT
amLODIPine 10 mg oral tablet: 1 tab(s) orally once a day  Aspir 81 oral delayed release tablet: 1 tab(s) orally once a day  atorvastatin 40 mg oral tablet: 1 tab(s) orally once a day  famotidine 40 mg oral tablet: 1 tab(s) orally once a day (at bedtime)  metoprolol succinate 25 mg oral tablet, extended release: 1 tab(s) orally once a day  sitagliptin-metformin 50 mg-500 mg oral tablet, extended release: 1 tab(s) orally 2 times a day  Vitamin D3 25 mcg (1000 intl units) oral tablet: 1 tab(s) orally once a day   amLODIPine 10 mg oral tablet: 1 tab(s) orally once a day  atorvastatin 40 mg oral tablet: 1 tab(s) orally once a day  famotidine 40 mg oral tablet: 1 tab(s) orally once a day (at bedtime)  metoprolol succinate 25 mg oral tablet, extended release: 1 tab(s) orally once a day  sitagliptin-metformin 50 mg-500 mg oral tablet, extended release: 1 tab(s) orally 2 times a day  Vitamin D3 25 mcg (1000 intl units) oral tablet: 1 tab(s) orally once a day

## 2021-12-16 NOTE — DISCHARGE NOTE PROVIDER - CARE PROVIDER_API CALL
Bob Salgado)  Orthopedics  [address], [address] [facility], [place] [address]  Phone: [phone]  Fax: [phone]  Follow Up Time:

## 2021-12-16 NOTE — DISCHARGE NOTE PROVIDER - HOSPITAL COURSE
Admitted  Surgery RIGHT THR   Danita-op Antibiotics  Pain control  DVT prophylaxis  OOB/Physical Therapy

## 2021-12-16 NOTE — DISCHARGE NOTE PROVIDER - NSDCFUSCHEDAPPT_GEN_ALL_CORE_FT
HUANG GREGORY ; 01/03/2022 ; [facility] [address] HUANG GREGORY ; 01/31/2022 ; [facility] [address]

## 2021-12-16 NOTE — DISCHARGE NOTE PROVIDER - NSDCFUADDINST_GEN_ALL_CORE_FT
See Dr. Salgado instruction sheet See Dr. Salgado instruction sheet.  See Dr. Salgado instruction sheet.    ***FOLLOW UP WITH PRIMARY CARE PROVIDER IN NEXT 1-2 DAYS FOR BLOODWORK (complete blood count, basic metabolic panel)***

## 2021-12-16 NOTE — PROGRESS NOTE ADULT - SUBJECTIVE AND OBJECTIVE BOX
Ortho Post Op Check    Procedure: s/p R NUSRAT   Surgeon: Dr. Salgado    Pt having a lot of right hip pain. Has been given 4mg morphine IV and now receiving IV tylenol. States the pain is achy. Still having mild tingling in the left small toe. Denies CP, SOB, N/V.    T(C): 36.4 (12-16-21 @ 12:31), Max: 36.4 (12-16-21 @ 12:31)  HR: 72 (12-16-21 @ 14:45) (68 - 74)  BP: 134/79 (12-16-21 @ 14:31) (107/58 - 137/66)  RR: 19 (12-16-21 @ 14:45) (9 - 19)  SpO2: 95% (12-16-21 @ 14:45) (95% - 100%)  AVSS    General: Pt Alert and oriented, NAD  DSG C/D/I  Pulses: 2+ DP BLE   Sensation: decreased sensation L small toe otherwise SILT BLE   Motor: EHL/FHL/TA/GS 5/5 BLE    Post-op X-Ray: R hip prosthesis in good position     A/P: 66yMale POD#0 s/p R NUSRAT   - Stable  - Pain Control  - DVT ppx: ASA   - Post op abx: Ancef   - PT, WBS: WBAT    Ortho Pager [phone]

## 2021-12-16 NOTE — PHYSICAL THERAPY INITIAL EVALUATION ADULT - PERTINENT HX OF CURRENT PROBLEM, REHAB EVAL
66M with right hip pain radiating to his thigh x 6 months.  Pt states pain began slowly and progressed denying any precipitating event. Pt takes no medications for the pain . Pt ambulates independently without assistive device. Pt has attempted and failed conservative treatment for his right hip pain consisting of PT.

## 2021-12-17 PROBLEM — M19.90 UNSPECIFIED OSTEOARTHRITIS, UNSPECIFIED SITE: Chronic | Status: ACTIVE | Noted: 2021-12-15

## 2021-12-17 PROBLEM — I21.9 ACUTE MYOCARDIAL INFARCTION, UNSPECIFIED: Chronic | Status: ACTIVE | Noted: 2021-12-15

## 2021-12-17 PROBLEM — E11.9 TYPE 2 DIABETES MELLITUS WITHOUT COMPLICATIONS: Chronic | Status: ACTIVE | Noted: 2021-12-15

## 2021-12-17 LAB
A1C WITH ESTIMATED AVERAGE GLUCOSE RESULT: 6 % — HIGH (ref 4–5.6)
ANION GAP SERPL CALC-SCNC: 11 MMOL/L — SIGNIFICANT CHANGE UP (ref 5–17)
BUN SERPL-MCNC: 26 MG/DL — HIGH (ref 7–23)
CALCIUM SERPL-MCNC: 9.2 MG/DL — SIGNIFICANT CHANGE UP (ref 8.4–10.5)
CHLORIDE SERPL-SCNC: 104 MMOL/L — SIGNIFICANT CHANGE UP (ref 96–108)
CO2 SERPL-SCNC: 25 MMOL/L — SIGNIFICANT CHANGE UP (ref 22–31)
CREAT SERPL-MCNC: 1.49 MG/DL — HIGH (ref 0.5–1.3)
ESTIMATED AVERAGE GLUCOSE: 126 MG/DL — HIGH (ref 68–114)
GLUCOSE BLDC GLUCOMTR-MCNC: 131 MG/DL — HIGH (ref 70–99)
GLUCOSE BLDC GLUCOMTR-MCNC: 141 MG/DL — HIGH (ref 70–99)
GLUCOSE BLDC GLUCOMTR-MCNC: 186 MG/DL — HIGH (ref 70–99)
GLUCOSE BLDC GLUCOMTR-MCNC: 209 MG/DL — HIGH (ref 70–99)
GLUCOSE BLDC GLUCOMTR-MCNC: 219 MG/DL — HIGH (ref 70–99)
GLUCOSE SERPL-MCNC: 135 MG/DL — HIGH (ref 70–99)
HCT VFR BLD CALC: 39.1 % — SIGNIFICANT CHANGE UP (ref 39–50)
HCV AB S/CO SERPL IA: 0.04 S/CO — SIGNIFICANT CHANGE UP
HCV AB SERPL-IMP: SIGNIFICANT CHANGE UP
HGB BLD-MCNC: 13.3 G/DL — SIGNIFICANT CHANGE UP (ref 13–17)
MCHC RBC-ENTMCNC: 30 PG — SIGNIFICANT CHANGE UP (ref 27–34)
MCHC RBC-ENTMCNC: 34 GM/DL — SIGNIFICANT CHANGE UP (ref 32–36)
MCV RBC AUTO: 88.1 FL — SIGNIFICANT CHANGE UP (ref 80–100)
NRBC # BLD: 0 /100 WBCS — SIGNIFICANT CHANGE UP (ref 0–0)
PLATELET # BLD AUTO: 220 K/UL — SIGNIFICANT CHANGE UP (ref 150–400)
POTASSIUM SERPL-MCNC: 4.1 MMOL/L — SIGNIFICANT CHANGE UP (ref 3.5–5.3)
POTASSIUM SERPL-SCNC: 4.1 MMOL/L — SIGNIFICANT CHANGE UP (ref 3.5–5.3)
RBC # BLD: 4.44 M/UL — SIGNIFICANT CHANGE UP (ref 4.2–5.8)
RBC # FLD: 13.6 % — SIGNIFICANT CHANGE UP (ref 10.3–14.5)
SODIUM SERPL-SCNC: 140 MMOL/L — SIGNIFICANT CHANGE UP (ref 135–145)
WBC # BLD: 7.61 K/UL — SIGNIFICANT CHANGE UP (ref 3.8–10.5)
WBC # FLD AUTO: 7.61 K/UL — SIGNIFICANT CHANGE UP (ref 3.8–10.5)

## 2021-12-17 PROCEDURE — 99222 1ST HOSP IP/OBS MODERATE 55: CPT

## 2021-12-17 RX ORDER — METFORMIN HYDROCHLORIDE 850 MG/1
1 TABLET ORAL
Qty: 0 | Refills: 0 | DISCHARGE
Start: 2021-12-17

## 2021-12-17 RX ORDER — ASPIRIN/CALCIUM CARB/MAGNESIUM 324 MG
1 TABLET ORAL
Qty: 0 | Refills: 0 | DISCHARGE

## 2021-12-17 RX ADMIN — SCOPALAMINE 1 PATCH: 1 PATCH, EXTENDED RELEASE TRANSDERMAL at 19:45

## 2021-12-17 RX ADMIN — OXYCODONE HYDROCHLORIDE 10 MILLIGRAM(S): 5 TABLET ORAL at 06:15

## 2021-12-17 RX ADMIN — OXYCODONE HYDROCHLORIDE 10 MILLIGRAM(S): 5 TABLET ORAL at 05:15

## 2021-12-17 RX ADMIN — AMLODIPINE BESYLATE 10 MILLIGRAM(S): 2.5 TABLET ORAL at 17:45

## 2021-12-17 RX ADMIN — Medication 81 MILLIGRAM(S): at 05:12

## 2021-12-17 RX ADMIN — OXYCODONE HYDROCHLORIDE 10 MILLIGRAM(S): 5 TABLET ORAL at 10:26

## 2021-12-17 RX ADMIN — Medication 1000 UNIT(S): at 13:33

## 2021-12-17 RX ADMIN — OXYCODONE HYDROCHLORIDE 10 MILLIGRAM(S): 5 TABLET ORAL at 14:33

## 2021-12-17 RX ADMIN — ATORVASTATIN CALCIUM 40 MILLIGRAM(S): 80 TABLET, FILM COATED ORAL at 21:10

## 2021-12-17 RX ADMIN — METFORMIN HYDROCHLORIDE 500 MILLIGRAM(S): 850 TABLET ORAL at 05:12

## 2021-12-17 RX ADMIN — ONDANSETRON 4 MILLIGRAM(S): 8 TABLET, FILM COATED ORAL at 09:28

## 2021-12-17 RX ADMIN — METFORMIN HYDROCHLORIDE 500 MILLIGRAM(S): 850 TABLET ORAL at 17:45

## 2021-12-17 RX ADMIN — OXYCODONE HYDROCHLORIDE 10 MILLIGRAM(S): 5 TABLET ORAL at 21:10

## 2021-12-17 RX ADMIN — Medication 2: at 21:11

## 2021-12-17 RX ADMIN — Medication 1: at 18:32

## 2021-12-17 RX ADMIN — Medication 25 MILLIGRAM(S): at 05:12

## 2021-12-17 RX ADMIN — POLYETHYLENE GLYCOL 3350 17 GRAM(S): 17 POWDER, FOR SOLUTION ORAL at 21:16

## 2021-12-17 RX ADMIN — Medication 81 MILLIGRAM(S): at 17:45

## 2021-12-17 RX ADMIN — FAMOTIDINE 40 MILLIGRAM(S): 10 INJECTION INTRAVENOUS at 21:10

## 2021-12-17 RX ADMIN — SCOPALAMINE 1 PATCH: 1 PATCH, EXTENDED RELEASE TRANSDERMAL at 07:24

## 2021-12-17 RX ADMIN — OXYCODONE HYDROCHLORIDE 10 MILLIGRAM(S): 5 TABLET ORAL at 22:10

## 2021-12-17 RX ADMIN — OXYCODONE HYDROCHLORIDE 5 MILLIGRAM(S): 5 TABLET ORAL at 01:38

## 2021-12-17 RX ADMIN — ONDANSETRON 4 MILLIGRAM(S): 8 TABLET, FILM COATED ORAL at 17:44

## 2021-12-17 RX ADMIN — SENNA PLUS 2 TABLET(S): 8.6 TABLET ORAL at 21:10

## 2021-12-17 RX ADMIN — Medication 100 MILLIGRAM(S): at 02:47

## 2021-12-17 RX ADMIN — OXYCODONE HYDROCHLORIDE 10 MILLIGRAM(S): 5 TABLET ORAL at 09:26

## 2021-12-17 RX ADMIN — OXYCODONE HYDROCHLORIDE 5 MILLIGRAM(S): 5 TABLET ORAL at 00:38

## 2021-12-17 RX ADMIN — OXYCODONE HYDROCHLORIDE 10 MILLIGRAM(S): 5 TABLET ORAL at 13:33

## 2021-12-17 NOTE — CONSULT NOTE ADULT - ASSESSMENT
66M w h/o CAD s/p Stent 2010, HTN, HLD, NIDDM2 (5.9), R hip OA here s/p R NUSRAT w Dr. Salgado 12/16    #Post-op state. Pain controlled. on ASA BID, On incentive spirometer and bowel regimen  #R hip OA - mgmt per orthopedics  #CAD - c/w home aspirin, statin. No chest pain  #HTN - BP at target. c/w home amlodipine, toprol 25  #HLD - c/w home statin  #NIDDM2 - c/w home metformin and sitagliptin. A1C 5.9  #CKD3 - Cr baseline 1.8. Today 1.5    Recommendations  From medical standpoint, pt optimized for disposition  Caution w NSAIDs due to CKD3    DISPO: Home w HPT pending clearing PT

## 2021-12-17 NOTE — PROGRESS NOTE ADULT - SUBJECTIVE AND OBJECTIVE BOX
Ortho    Procedure: R NUSRAT (anterior)    Surgeon: Dr. Salgado  Procedure Date: 12/16/21     Patient seen and evaluated at bedside in AM. Wife at bedside. Patient reports pain improving but PT was limited due to nausea. Patient still endorses nausea but subsiding.     Vital Signs Last 24 Hrs  T(C): 37.1 (17 Dec 2021 08:46), Max: 37.1 (17 Dec 2021 08:46)  T(F): 98.8 (17 Dec 2021 08:46), Max: 98.8 (17 Dec 2021 08:46)  HR: 90 (17 Dec 2021 08:46) (68 - 90)  BP: 154/73 (17 Dec 2021 08:46) (115/67 - 154/73)  BP(mean): 93 (16 Dec 2021 16:01) (86 - 102)  RR: 18 (17 Dec 2021 08:46) (9 - 26)  SpO2: 93% (17 Dec 2021 08:46) (93% - 100%)    General: Pt Alert and oriented, NAD  DSG C/D/I  Pulses: 2+ PT   Sensation: SILT s/s/sp/dp/t  Motor: EHL/FHL/TA/GS 5/5    A/P: 66yMale s/p R NUSRAT   - Stable  - Pain Control  - DVT ppx: ASA   - Post op abx: Ancef   - PT, WBS: WBAT    Ortho Pager [phone]

## 2021-12-17 NOTE — CONSULT NOTE ADULT - SUBJECTIVE AND OBJECTIVE BOX
HPI "66M with right hip pain radiating to his thigh x 6 months.  Pt states pain began slowly and progressed denying any precipitating event. Pt takes no medications for the pain . Pt ambulates independently without assistive device. Pt has attempted and failed conservative treatment for his right hip pain consisting of PT.  Pt has history of 1x cardiac stent for which he takes daily asa. Pt denies numbness and tingling down lower extremities b/l, fever, chills, recent illness, CP, SOB, N/V, or any other complaints.     Presents today for right NUSRAT.  (15 Dec 2021 14:16)"    66M w h/o CAD s/p Stent 2010, HTN, HLD, NIDDM2 (5.9), R hip OA here s/p R NUSRAT w Dr. Salgado 12/16    Pt reports progressive R hip pain that did not improve despite conservative measures. Yesterday had issues w nausea after pain medication. Today states this has improved. Ate lunch wo issues. +Flatus wo BM. Voiding wo dysuria. Pain controlled in R hip. No fever, chest pain, dypsnea. Eager to return home.    ROS: 12 point ROS reviewed and otherwise negative  FH: No VTE/PE  SH: Non smoker, No EtOPH        PAST MEDICAL & SURGICAL HISTORY:  CAD (coronary artery disease)    HTN (hypertension)    GERD (gastroesophageal reflux disease)    Percutaneous transluminal coronary angioplasty status    IBS (irritable bowel syndrome)    Colon polyp    Hyperlipidemia    Retinal detachment    OA (osteoarthritis)  right hip    Type 2 diabetes mellitus    Myocardial infarct    S/P cardiac catheterization    S/P shoulder surgery  right    S/P wrist surgery  right    S/P knee surgery  bilateral    History of hernia repair    Abnormal nasal septum    H/O repair of rotator cuff    Coronary stent patent      Home Meds: Home Medications:  amLODIPine 10 mg oral tablet: 1 tab(s) orally once a day (16 Dec 2021 08:28)  atorvastatin 40 mg oral tablet: 1 tab(s) orally once a day (16 Dec 2021 08:28)  famotidine 40 mg oral tablet: 1 tab(s) orally once a day (at bedtime) (16 Dec 2021 08:28)  metoprolol succinate 25 mg oral tablet, extended release: 1 tab(s) orally once a day (16 Dec 2021 08:28)  sitagliptin-metformin 50 mg-500 mg oral tablet, extended release: 1 tab(s) orally 2 times a day (16 Dec 2021 08:28)  Vitamin D3 25 mcg (1000 intl units) oral tablet: 1 tab(s) orally once a day (16 Dec 2021 08:28)    Allergies: Allergies    levofloxacin (Other)    Intolerances      Soc:   Advanced Directives: Presumed Full Code     CURRENT MEDICATIONS:   --------------------------------------------------------------------------------------  Neurologic Medications  acetaminophen     Tablet .. 650 milliGRAM(s) Oral every 6 hours PRN Temp greater or equal to 38.5C (101.3F), Mild Pain (1 - 3)  HYDROmorphone  Injectable 0.5 milliGRAM(s) IV Push every 15 minutes PRN breakthrough pain  HYDROmorphone  Injectable 0.5 milliGRAM(s) IV Push every 4 hours PRN Severe Pain (7 - 10), breakthrough pain  metoclopramide Injectable 10 milliGRAM(s) IV Push every 8 hours PRN 2nd line nausea  ondansetron Injectable 4 milliGRAM(s) IV Push every 6 hours PRN Nausea and/or Vomiting  oxyCODONE    IR 5 milliGRAM(s) Oral every 3 hours PRN Moderate Pain (4 - 6)  oxyCODONE    IR 10 milliGRAM(s) Oral every 3 hours PRN Severe Pain (7 - 10)    Respiratory Medications    Cardiovascular Medications  amLODIPine   Tablet 10 milliGRAM(s) Oral daily  metoprolol succinate ER 25 milliGRAM(s) Oral daily    Gastrointestinal Medications  cholecalciferol 1000 Unit(s) Oral daily  dextrose 5%. 1000 milliLiter(s) IV Continuous <Continuous>  dextrose 5%. 1000 milliLiter(s) IV Continuous <Continuous>  famotidine    Tablet 40 milliGRAM(s) Oral at bedtime  lactated ringers. 1000 milliLiter(s) IV Continuous <Continuous>  magnesium hydroxide Suspension 30 milliLiter(s) Oral daily PRN Constipation  polyethylene glycol 3350 17 Gram(s) Oral at bedtime  senna 2 Tablet(s) Oral at bedtime    Genitourinary Medications    Hematologic/Oncologic Medications  aspirin  chewable 81 milliGRAM(s) Oral two times a day    Antimicrobial/Immunologic Medications    Endocrine/Metabolic Medications  atorvastatin 40 milliGRAM(s) Oral at bedtime  dextrose 40% Gel 15 Gram(s) Oral once  dextrose 50% Injectable 25 Gram(s) IV Push once  dextrose 50% Injectable 12.5 Gram(s) IV Push once  dextrose 50% Injectable 25 Gram(s) IV Push once  glucagon  Injectable 1 milliGRAM(s) IntraMuscular once  insulin lispro (ADMELOG) corrective regimen sliding scale   SubCutaneous Before meals and at bedtime  metFORMIN 500 milliGRAM(s) Oral two times a day  sitaGLIPtin 50 milliGRAM(s) Oral <User Schedule>    Topical/Other Medications  BUpivacaine liposome 1.3% Injectable (no eMAR) 20 milliLiter(s) Local Injection once    --------------------------------------------------------------------------------------    VITAL SIGNS, INS/OUTS (last 24 hours):  --------------------------------------------------------------------------------------  ICU Vital Signs Last 24 Hrs  T(C): 37.6 (17 Dec 2021 18:30), Max: 37.8 (17 Dec 2021 15:20)  T(F): 99.6 (17 Dec 2021 18:30), Max: 100 (17 Dec 2021 15:20)  HR: 92 (17 Dec 2021 15:20) (80 - 92)  BP: 152/83 (17 Dec 2021 15:20) (143/65 - 154/73)  BP(mean): --  ABP: --  ABP(mean): --  RR: 16 (17 Dec 2021 15:20) (16 - 18)  SpO2: 96% (17 Dec 2021 15:20) (93% - 96%)    I&O's Summary    16 Dec 2021 07:01  -  17 Dec 2021 07:00  --------------------------------------------------------  IN: 470 mL / OUT: 950 mL / NET: -480 mL      --------------------------------------------------------------------------------------    EXAM:  GEN: Male in NAD on RA  HEENT: NC/AT, MMM  CV: RRR, nml S1S2, no murmurs  PULM: nml effort, CTAB wo rales,  ABD: Soft, non-distended, NABS, non-tender  NEURO  A/O x3, moving all extremities, Sensation intact  R Hip dressing c/d/i. plantflex/ext 5/5.   PSYCH: Appropriate      LABS  --------------------------------------------------------------------------------------  Labs:  CAPILLARY BLOOD GLUCOSE      POCT Blood Glucose.: 186 mg/dL (17 Dec 2021 18:30)  POCT Blood Glucose.: 209 mg/dL (17 Dec 2021 17:54)  POCT Blood Glucose.: 141 mg/dL (17 Dec 2021 12:43)  POCT Blood Glucose.: 131 mg/dL (17 Dec 2021 07:22)  POCT Blood Glucose.: 196 mg/dL (16 Dec 2021 21:45)                          13.3   7.61  )-----------( 220      ( 17 Dec 2021 07:53 )             39.1         12-17    140  |  104  |  26<H>  ----------------------------<  135<H>  4.1   |  25  |  1.49<H>      Calcium, Total Serum: 9.2 mg/dL (12-17-21 @ 07:53)      LFTs:         Coags:                  --------------------------------------------------------------------------------------    OTHER LABS    IMAGING RESULTS  ****************

## 2021-12-17 NOTE — PROGRESS NOTE ADULT - SUBJECTIVE AND OBJECTIVE BOX
POST OPERATIVE DAY #: 1  STATUS POST:  Right THR                        SUBJECTIVE: Patient seen and examined. Pt. states he feels nausea likely due to the pain meds (but helps a lot with pain). Pt. was not able to walk much yesterday due to nausea. Denies any sob/cp/n/v/numbness or tingling in b/l les.     OBJECTIVE:     Vital Signs Last 24 Hrs  T(C): 37.1 (17 Dec 2021 08:46), Max: 37.1 (17 Dec 2021 08:46)  T(F): 98.8 (17 Dec 2021 08:46), Max: 98.8 (17 Dec 2021 08:46)  HR: 90 (17 Dec 2021 08:46) (68 - 90)  BP: 154/73 (17 Dec 2021 08:46) (107/58 - 154/73)  BP(mean): 93 (16 Dec 2021 16:01) (78 - 102)  RR: 18 (17 Dec 2021 08:46) (9 - 26)  SpO2: 93% (17 Dec 2021 08:46) (93% - 100%)    General: in bed, A&O x 3  Affected extremity: right le skin intact, no erythema/ecchymosis/sts         Dressing: clean/dry/intact          Sensation: intact to light touch to patient's baseline         Motor exam: EHL/TA/GS 5/5  Pulses 2+             I&O's Detail    16 Dec 2021 07:01  -  17 Dec 2021 07:00  --------------------------------------------------------  IN:    IV PiggyBack: 50 mL    Lactated Ringers: 300 mL    Oral Fluid: 120 mL  Total IN: 470 mL    OUT:    Voided (mL): 950 mL  Total OUT: 950 mL    Total NET: -480 mL          LABS:                        13.3   7.61  )-----------( 220      ( 17 Dec 2021 07:53 )             39.1     12-17    140  |  104  |  26<H>  ----------------------------<  135<H>  4.1   |  25  |  1.49<H>    Ca    9.2      17 Dec 2021 07:53            MEDICATIONS:    acetaminophen     Tablet .. 650 milliGRAM(s) Oral every 6 hours PRN  HYDROmorphone  Injectable 0.5 milliGRAM(s) IV Push every 15 minutes PRN  HYDROmorphone  Injectable 0.5 milliGRAM(s) IV Push every 4 hours PRN  metoclopramide Injectable 10 milliGRAM(s) IV Push every 8 hours PRN  ondansetron Injectable 4 milliGRAM(s) IV Push every 6 hours PRN  oxyCODONE    IR 5 milliGRAM(s) Oral every 3 hours PRN  oxyCODONE    IR 10 milliGRAM(s) Oral every 3 hours PRN    aspirin  chewable 81 milliGRAM(s) Oral two times a day        ASSESSMENT AND PLAN: 65yo Male s/p RIGHT THR    1. Analgesic pain control- offered Tramadol to patient, but would like to use oxy because it helps with pain   2. DVT prophylaxis: ASA     SCDs       3. Weight Bearing Status:  Weight bearing as tolerated       4. Disposition: Home pending PT clearance  5. Hx of MI in 2011- ekg ordered this am will d/w Dr. Vega so far currently stable on regional unit.   6. Nausea- will have zofran/reglan prn  7. HTN- Continue home medication  8. DM- Continue home medication, ISS, FS  9. HLD- Continue home medication  POST OPERATIVE DAY #: 1  STATUS POST:  Right THR                        SUBJECTIVE: Patient seen and examined. Pt. states he feels nausea likely due to the pain meds (but helps a lot with pain). Pt. was not able to walk much yesterday due to nausea. Denies any sob/cp/n/v/numbness or tingling in b/l les.     OBJECTIVE:     Vital Signs Last 24 Hrs  T(C): 37.1 (17 Dec 2021 08:46), Max: 37.1 (17 Dec 2021 08:46)  T(F): 98.8 (17 Dec 2021 08:46), Max: 98.8 (17 Dec 2021 08:46)  HR: 90 (17 Dec 2021 08:46) (68 - 90)  BP: 154/73 (17 Dec 2021 08:46) (107/58 - 154/73)  BP(mean): 93 (16 Dec 2021 16:01) (78 - 102)  RR: 18 (17 Dec 2021 08:46) (9 - 26)  SpO2: 93% (17 Dec 2021 08:46) (93% - 100%)    General: in bed, A&O x 3  Affected extremity: right le skin intact, no erythema/ecchymosis/sts         Dressing: clean/dry/intact          Sensation: intact to light touch to patient's baseline         Motor exam: EHL/TA/GS 5/5  Pulses 2+             I&O's Detail    16 Dec 2021 07:01  -  17 Dec 2021 07:00  --------------------------------------------------------  IN:    IV PiggyBack: 50 mL    Lactated Ringers: 300 mL    Oral Fluid: 120 mL  Total IN: 470 mL    OUT:    Voided (mL): 950 mL  Total OUT: 950 mL    Total NET: -480 mL          LABS:                        13.3   7.61  )-----------( 220      ( 17 Dec 2021 07:53 )             39.1     12-17    140  |  104  |  26<H>  ----------------------------<  135<H>  4.1   |  25  |  1.49<H>    Ca    9.2      17 Dec 2021 07:53            MEDICATIONS:    acetaminophen     Tablet .. 650 milliGRAM(s) Oral every 6 hours PRN  HYDROmorphone  Injectable 0.5 milliGRAM(s) IV Push every 15 minutes PRN  HYDROmorphone  Injectable 0.5 milliGRAM(s) IV Push every 4 hours PRN  metoclopramide Injectable 10 milliGRAM(s) IV Push every 8 hours PRN  ondansetron Injectable 4 milliGRAM(s) IV Push every 6 hours PRN  oxyCODONE    IR 5 milliGRAM(s) Oral every 3 hours PRN  oxyCODONE    IR 10 milliGRAM(s) Oral every 3 hours PRN    aspirin  chewable 81 milliGRAM(s) Oral two times a day        ASSESSMENT AND PLAN: 67yo Male s/p RIGHT THR    1. Analgesic pain control- offered Tramadol to patient, but would like to use oxy because it helps with pain   2. DVT prophylaxis: ASA     SCDs       3. Weight Bearing Status:  Weight bearing as tolerated       4. Disposition: Home pending PT clearance  5. Hx of MI in 2011- ekg ordered this am will d/w Dr. Vega so far currently stable on regional unit. Stable per Dr. Vega  6. Nausea- will have zofran/reglan prn  7. HTN- Continue home medication  8. DM- Continue home medication, ISS, FS  9. HLD- Continue home medication

## 2021-12-18 LAB
ANION GAP SERPL CALC-SCNC: 11 MMOL/L — SIGNIFICANT CHANGE UP (ref 5–17)
BUN SERPL-MCNC: 22 MG/DL — SIGNIFICANT CHANGE UP (ref 7–23)
CALCIUM SERPL-MCNC: 9.6 MG/DL — SIGNIFICANT CHANGE UP (ref 8.4–10.5)
CHLORIDE SERPL-SCNC: 102 MMOL/L — SIGNIFICANT CHANGE UP (ref 96–108)
CO2 SERPL-SCNC: 25 MMOL/L — SIGNIFICANT CHANGE UP (ref 22–31)
CREAT SERPL-MCNC: 1.65 MG/DL — HIGH (ref 0.5–1.3)
GLUCOSE BLDC GLUCOMTR-MCNC: 126 MG/DL — HIGH (ref 70–99)
GLUCOSE BLDC GLUCOMTR-MCNC: 148 MG/DL — HIGH (ref 70–99)
GLUCOSE BLDC GLUCOMTR-MCNC: 160 MG/DL — HIGH (ref 70–99)
GLUCOSE BLDC GLUCOMTR-MCNC: 204 MG/DL — HIGH (ref 70–99)
GLUCOSE BLDC GLUCOMTR-MCNC: 231 MG/DL — HIGH (ref 70–99)
GLUCOSE SERPL-MCNC: 160 MG/DL — HIGH (ref 70–99)
HCT VFR BLD CALC: 40.4 % — SIGNIFICANT CHANGE UP (ref 39–50)
HGB BLD-MCNC: 13.7 G/DL — SIGNIFICANT CHANGE UP (ref 13–17)
MCHC RBC-ENTMCNC: 30 PG — SIGNIFICANT CHANGE UP (ref 27–34)
MCHC RBC-ENTMCNC: 33.9 GM/DL — SIGNIFICANT CHANGE UP (ref 32–36)
MCV RBC AUTO: 88.4 FL — SIGNIFICANT CHANGE UP (ref 80–100)
NRBC # BLD: 0 /100 WBCS — SIGNIFICANT CHANGE UP (ref 0–0)
PLATELET # BLD AUTO: 211 K/UL — SIGNIFICANT CHANGE UP (ref 150–400)
POTASSIUM SERPL-MCNC: 4.1 MMOL/L — SIGNIFICANT CHANGE UP (ref 3.5–5.3)
POTASSIUM SERPL-SCNC: 4.1 MMOL/L — SIGNIFICANT CHANGE UP (ref 3.5–5.3)
RBC # BLD: 4.57 M/UL — SIGNIFICANT CHANGE UP (ref 4.2–5.8)
RBC # FLD: 13.8 % — SIGNIFICANT CHANGE UP (ref 10.3–14.5)
SODIUM SERPL-SCNC: 138 MMOL/L — SIGNIFICANT CHANGE UP (ref 135–145)
WBC # BLD: 8.41 K/UL — SIGNIFICANT CHANGE UP (ref 3.8–10.5)
WBC # FLD AUTO: 8.41 K/UL — SIGNIFICANT CHANGE UP (ref 3.8–10.5)

## 2021-12-18 PROCEDURE — 99232 SBSQ HOSP IP/OBS MODERATE 35: CPT

## 2021-12-18 RX ORDER — OXYCODONE HYDROCHLORIDE 5 MG/1
1 TABLET ORAL
Qty: 30 | Refills: 0
Start: 2021-12-18

## 2021-12-18 RX ORDER — KETOROLAC TROMETHAMINE 30 MG/ML
15 SYRINGE (ML) INJECTION
Refills: 0 | Status: DISCONTINUED | OUTPATIENT
Start: 2021-12-18 | End: 2021-12-19

## 2021-12-18 RX ORDER — ASPIRIN/CALCIUM CARB/MAGNESIUM 324 MG
1 TABLET ORAL
Qty: 28 | Refills: 0
Start: 2021-12-18 | End: 2021-12-31

## 2021-12-18 RX ORDER — CELECOXIB 200 MG/1
1 CAPSULE ORAL
Qty: 60 | Refills: 0
Start: 2021-12-18 | End: 2022-01-16

## 2021-12-18 RX ORDER — CYCLOBENZAPRINE HYDROCHLORIDE 10 MG/1
5 TABLET, FILM COATED ORAL THREE TIMES A DAY
Refills: 0 | Status: DISCONTINUED | OUTPATIENT
Start: 2021-12-18 | End: 2021-12-19

## 2021-12-18 RX ADMIN — SENNA PLUS 2 TABLET(S): 8.6 TABLET ORAL at 22:32

## 2021-12-18 RX ADMIN — Medication 81 MILLIGRAM(S): at 05:12

## 2021-12-18 RX ADMIN — Medication 1000 UNIT(S): at 12:26

## 2021-12-18 RX ADMIN — CYCLOBENZAPRINE HYDROCHLORIDE 5 MILLIGRAM(S): 10 TABLET, FILM COATED ORAL at 06:56

## 2021-12-18 RX ADMIN — SCOPALAMINE 1 PATCH: 1 PATCH, EXTENDED RELEASE TRANSDERMAL at 18:42

## 2021-12-18 RX ADMIN — METFORMIN HYDROCHLORIDE 500 MILLIGRAM(S): 850 TABLET ORAL at 05:12

## 2021-12-18 RX ADMIN — ATORVASTATIN CALCIUM 40 MILLIGRAM(S): 80 TABLET, FILM COATED ORAL at 22:33

## 2021-12-18 RX ADMIN — FAMOTIDINE 40 MILLIGRAM(S): 10 INJECTION INTRAVENOUS at 22:32

## 2021-12-18 RX ADMIN — METFORMIN HYDROCHLORIDE 500 MILLIGRAM(S): 850 TABLET ORAL at 17:59

## 2021-12-18 RX ADMIN — Medication 15 MILLIGRAM(S): at 18:12

## 2021-12-18 RX ADMIN — Medication 81 MILLIGRAM(S): at 17:59

## 2021-12-18 RX ADMIN — Medication 15 MILLIGRAM(S): at 11:22

## 2021-12-18 RX ADMIN — Medication 2: at 12:46

## 2021-12-18 RX ADMIN — Medication 1: at 18:12

## 2021-12-18 RX ADMIN — Medication 25 MILLIGRAM(S): at 05:12

## 2021-12-18 RX ADMIN — SCOPALAMINE 1 PATCH: 1 PATCH, EXTENDED RELEASE TRANSDERMAL at 07:15

## 2021-12-18 RX ADMIN — Medication 15 MILLIGRAM(S): at 18:42

## 2021-12-18 RX ADMIN — Medication 15 MILLIGRAM(S): at 11:45

## 2021-12-18 NOTE — PROGRESS NOTE ADULT - ASSESSMENT
66M w h/o CAD s/p Stent 2010, HTN, HLD, NIDDM2 (5.9), R hip OA here s/p R NUSRAT w Dr. Salgado 12/16    #Post-op state. Pain well controlled. on ASA BID, On incentive spirometer and bowel regimen. Has not needed opiates since yesterday.  #R hip OA - mgmt per orthopedics  #CAD - c/w home aspirin, statin. No chest pain  #HTN - BP at target. c/w home amlodipine, toprol 25  #HLD - c/w home statin  #NIDDM2 - c/w home metformin and sitagliptin. A1C 5.9  #CKD3 - Cr baseline 1.8. Today 1.5    Recommendations  From medical standpoint, pt optimized for disposition  Caution w NSAIDs due to CKD3    DISPO: Home w HPT possibly today

## 2021-12-18 NOTE — PROGRESS NOTE ADULT - SUBJECTIVE AND OBJECTIVE BOX
Patient is a 66y old  Male who presents with a chief complaint of right hip pain (18 Dec 2021 08:22)      SUBJECTIVE / OVERNIGHT EVENTS:  Reports no overt symptoms.  Feels better. Intermittent nausea.    MEDICATIONS  (STANDING):  amLODIPine   Tablet 10 milliGRAM(s) Oral daily  aspirin  chewable 81 milliGRAM(s) Oral two times a day  atorvastatin 40 milliGRAM(s) Oral at bedtime  BUpivacaine liposome 1.3% Injectable (no eMAR) 20 milliLiter(s) Local Injection once  cholecalciferol 1000 Unit(s) Oral daily  dextrose 40% Gel 15 Gram(s) Oral once  dextrose 5%. 1000 milliLiter(s) (50 mL/Hr) IV Continuous <Continuous>  dextrose 5%. 1000 milliLiter(s) (100 mL/Hr) IV Continuous <Continuous>  dextrose 50% Injectable 25 Gram(s) IV Push once  dextrose 50% Injectable 12.5 Gram(s) IV Push once  dextrose 50% Injectable 25 Gram(s) IV Push once  famotidine    Tablet 40 milliGRAM(s) Oral at bedtime  glucagon  Injectable 1 milliGRAM(s) IntraMuscular once  insulin lispro (ADMELOG) corrective regimen sliding scale   SubCutaneous Before meals and at bedtime  ketorolac   Injectable 15 milliGRAM(s) IV Push two times a day  lactated ringers. 1000 milliLiter(s) (100 mL/Hr) IV Continuous <Continuous>  metFORMIN 500 milliGRAM(s) Oral two times a day  metoprolol succinate ER 25 milliGRAM(s) Oral daily  polyethylene glycol 3350 17 Gram(s) Oral at bedtime  senna 2 Tablet(s) Oral at bedtime  sitaGLIPtin 50 milliGRAM(s) Oral <User Schedule>    MEDICATIONS  (PRN):  acetaminophen     Tablet .. 650 milliGRAM(s) Oral every 6 hours PRN Temp greater or equal to 38.5C (101.3F), Mild Pain (1 - 3)  bisacodyl Suppository 10 milliGRAM(s) Rectal once PRN Constipation  cyclobenzaprine 5 milliGRAM(s) Oral three times a day PRN Muscle Spasm  HYDROmorphone  Injectable 0.5 milliGRAM(s) IV Push every 15 minutes PRN breakthrough pain  HYDROmorphone  Injectable 0.5 milliGRAM(s) IV Push every 4 hours PRN Severe Pain (7 - 10), breakthrough pain  magnesium hydroxide Suspension 30 milliLiter(s) Oral daily PRN Constipation  metoclopramide Injectable 10 milliGRAM(s) IV Push every 8 hours PRN 2nd line nausea  ondansetron Injectable 4 milliGRAM(s) IV Push every 6 hours PRN Nausea and/or Vomiting  oxyCODONE    IR 5 milliGRAM(s) Oral every 3 hours PRN Moderate Pain (4 - 6)  oxyCODONE    IR 10 milliGRAM(s) Oral every 3 hours PRN Severe Pain (7 - 10)      CAPILLARY BLOOD GLUCOSE      POCT Blood Glucose.: 204 mg/dL (18 Dec 2021 12:42)  POCT Blood Glucose.: 231 mg/dL (18 Dec 2021 11:21)  POCT Blood Glucose.: 148 mg/dL (18 Dec 2021 07:04)  POCT Blood Glucose.: 219 mg/dL (17 Dec 2021 21:08)  POCT Blood Glucose.: 186 mg/dL (17 Dec 2021 18:30)  POCT Blood Glucose.: 209 mg/dL (17 Dec 2021 17:54)    I&O's Summary    17 Dec 2021 07:01  -  18 Dec 2021 07:00  --------------------------------------------------------  IN: 0 mL / OUT: 300 mL / NET: -300 mL        PHYSICAL EXAM:  Vital Signs Last 24 Hrs  T(C): 36.8 (18 Dec 2021 09:01), Max: 37.8 (17 Dec 2021 15:20)  T(F): 98.3 (18 Dec 2021 09:01), Max: 100 (17 Dec 2021 15:20)  HR: 80 (18 Dec 2021 09:01) (80 - 97)  BP: 148/81 (18 Dec 2021 09:01) (148/81 - 162/91)  BP(mean): --  RR: 17 (18 Dec 2021 09:01) (16 - 17)  SpO2: 94% (18 Dec 2021 09:01) (93% - 96%)  GEN: Male in NAD on RA  HEENT: NC/AT, MMM  CV: RRR, nml S1S2, no murmurs  PULM: nml effort, CTAB wo rales,  ABD: Soft, non-distended, NABS, non-tender  NEURO  A/O x3, moving all extremities, Sensation intact  R Hip dressing c/d/i. plantflex/ext 5/5.   PSYCH: Appropriate    LABS:                        13.7   8.41  )-----------( 211      ( 18 Dec 2021 06:10 )             40.4     12-18    138  |  102  |  22  ----------------------------<  160<H>  4.1   |  25  |  1.65<H>    Ca    9.6      18 Dec 2021 06:10                RADIOLOGY & ADDITIONAL TESTS:    Imaging Personally Reviewed:    Consultant(s) Notes Reviewed:      Care Discussed with Consultants/Other Providers:

## 2021-12-18 NOTE — PROGRESS NOTE ADULT - SUBJECTIVE AND OBJECTIVE BOX
Ortho    Procedure: R NUSRAT (anterior)    Surgeon: Dr. Salgado  Procedure Date: 12/16/21     Patient seen and evaluated at bedside in AM. Patient reports pain improving but PT was limited due to nausea. Patient still endorses nausea but subsiding.     Vital Signs Last 24 Hrs  T(C): 37.1 (18 Dec 2021 05:12), Max: 37.8 (17 Dec 2021 15:20)  T(F): 98.7 (18 Dec 2021 05:12), Max: 100 (17 Dec 2021 15:20)  HR: 97 (18 Dec 2021 05:12) (90 - 97)  BP: 161/89 (18 Dec 2021 05:12) (152/83 - 162/91)  BP(mean): --  RR: 16 (18 Dec 2021 05:12) (16 - 18)  SpO2: 93% (18 Dec 2021 05:12) (93% - 96%)    General: Pt Alert and oriented, NAD  DSG C/D/I  Pulses: 2+ PT   Sensation: SILT s/s/sp/dp/t  Motor: EHL/FHL/TA/GS 5/5    A/P: 66yMale s/p R NUSRAT on 12/16  - Stable  - Pain Control  - DVT ppx: ASA   - Post op abx: Ancef   - PT, WBS: WBAT  - dispo - home possibly today    Ortho Pager [phone]

## 2021-12-19 ENCOUNTER — TRANSCRIPTION ENCOUNTER (OUTPATIENT)
Age: 66
End: 2021-12-19

## 2021-12-19 VITALS
SYSTOLIC BLOOD PRESSURE: 129 MMHG | HEART RATE: 79 BPM | DIASTOLIC BLOOD PRESSURE: 77 MMHG | TEMPERATURE: 98 F | RESPIRATION RATE: 16 BRPM | OXYGEN SATURATION: 95 %

## 2021-12-19 LAB
ANION GAP SERPL CALC-SCNC: 11 MMOL/L — SIGNIFICANT CHANGE UP (ref 5–17)
BUN SERPL-MCNC: 37 MG/DL — HIGH (ref 7–23)
CALCIUM SERPL-MCNC: 9.7 MG/DL — SIGNIFICANT CHANGE UP (ref 8.4–10.5)
CHLORIDE SERPL-SCNC: 103 MMOL/L — SIGNIFICANT CHANGE UP (ref 96–108)
CO2 SERPL-SCNC: 25 MMOL/L — SIGNIFICANT CHANGE UP (ref 22–31)
CREAT SERPL-MCNC: 2 MG/DL — HIGH (ref 0.5–1.3)
GLUCOSE BLDC GLUCOMTR-MCNC: 117 MG/DL — HIGH (ref 70–99)
GLUCOSE BLDC GLUCOMTR-MCNC: 128 MG/DL — HIGH (ref 70–99)
GLUCOSE BLDC GLUCOMTR-MCNC: 136 MG/DL — HIGH (ref 70–99)
GLUCOSE SERPL-MCNC: 152 MG/DL — HIGH (ref 70–99)
HCT VFR BLD CALC: 39.1 % — SIGNIFICANT CHANGE UP (ref 39–50)
HGB BLD-MCNC: 12.9 G/DL — LOW (ref 13–17)
MCHC RBC-ENTMCNC: 29.6 PG — SIGNIFICANT CHANGE UP (ref 27–34)
MCHC RBC-ENTMCNC: 33 GM/DL — SIGNIFICANT CHANGE UP (ref 32–36)
MCV RBC AUTO: 89.7 FL — SIGNIFICANT CHANGE UP (ref 80–100)
NRBC # BLD: 0 /100 WBCS — SIGNIFICANT CHANGE UP (ref 0–0)
PLATELET # BLD AUTO: 219 K/UL — SIGNIFICANT CHANGE UP (ref 150–400)
POTASSIUM SERPL-MCNC: 4.2 MMOL/L — SIGNIFICANT CHANGE UP (ref 3.5–5.3)
POTASSIUM SERPL-SCNC: 4.2 MMOL/L — SIGNIFICANT CHANGE UP (ref 3.5–5.3)
RBC # BLD: 4.36 M/UL — SIGNIFICANT CHANGE UP (ref 4.2–5.8)
RBC # FLD: 13.9 % — SIGNIFICANT CHANGE UP (ref 10.3–14.5)
SODIUM SERPL-SCNC: 139 MMOL/L — SIGNIFICANT CHANGE UP (ref 135–145)
WBC # BLD: 8.03 K/UL — SIGNIFICANT CHANGE UP (ref 3.8–10.5)
WBC # FLD AUTO: 8.03 K/UL — SIGNIFICANT CHANGE UP (ref 3.8–10.5)

## 2021-12-19 RX ADMIN — SCOPALAMINE 1 PATCH: 1 PATCH, EXTENDED RELEASE TRANSDERMAL at 13:38

## 2021-12-19 RX ADMIN — Medication 81 MILLIGRAM(S): at 05:01

## 2021-12-19 RX ADMIN — ONDANSETRON 4 MILLIGRAM(S): 8 TABLET, FILM COATED ORAL at 14:15

## 2021-12-19 RX ADMIN — OXYCODONE HYDROCHLORIDE 10 MILLIGRAM(S): 5 TABLET ORAL at 15:16

## 2021-12-19 RX ADMIN — Medication 15 MILLIGRAM(S): at 05:01

## 2021-12-19 RX ADMIN — METFORMIN HYDROCHLORIDE 500 MILLIGRAM(S): 850 TABLET ORAL at 05:01

## 2021-12-19 RX ADMIN — OXYCODONE HYDROCHLORIDE 10 MILLIGRAM(S): 5 TABLET ORAL at 14:16

## 2021-12-19 RX ADMIN — Medication 25 MILLIGRAM(S): at 05:01

## 2021-12-19 RX ADMIN — Medication 1000 UNIT(S): at 13:40

## 2021-12-19 NOTE — DISCHARGE NOTE NURSING/CASE MANAGEMENT/SOCIAL WORK - PATIENT PORTAL LINK FT
You can access the FollowMyHealth Patient Portal offered by [facility] by registering at the following website: [email or web address]. By joining [facility]’s FollowMyHealth portal, you will also be able to view your health information using other applications (apps) compatible with our system.

## 2021-12-19 NOTE — PROGRESS NOTE ADULT - SUBJECTIVE AND OBJECTIVE BOX
Ortho Floor Note    Subjective:  Patient seen and evaluated at bedside in AM. Cleared PT yesterday and will be going home today but is asking about pain control.     Vital Signs Last 24 Hrs  T(C): 37.1 (18 Dec 2021 05:12), Max: 37.8 (17 Dec 2021 15:20)  T(F): 98.7 (18 Dec 2021 05:12), Max: 100 (17 Dec 2021 15:20)  HR: 97 (18 Dec 2021 05:12) (90 - 97)  BP: 161/89 (18 Dec 2021 05:12) (152/83 - 162/91)  BP(mean): --  RR: 16 (18 Dec 2021 05:12) (16 - 18)  SpO2: 93% (18 Dec 2021 05:12) (93% - 96%)    General: Pt Alert and oriented, NAD  DSG C/D/I  Pulses: 2+ PT   Sensation: SILT s/s/sp/dp/t  Motor: EHL/FHL/TA/GS 5/5    A/P: 66yMale s/p R NUSRAT on 12/16  - Stable  - Pain Control  - DVT ppx: ASA   - PT, WBS: WBAT  - Dispo - home today    Ortho Pager [phone]

## 2021-12-25 DIAGNOSIS — I12.9 HYPERTENSIVE CHRONIC KIDNEY DISEASE WITH STAGE 1 THROUGH STAGE 4 CHRONIC KIDNEY DISEASE, OR UNSPECIFIED CHRONIC KIDNEY DISEASE: ICD-10-CM

## 2021-12-25 DIAGNOSIS — M16.11 UNILATERAL PRIMARY OSTEOARTHRITIS, RIGHT HIP: ICD-10-CM

## 2021-12-25 DIAGNOSIS — E11.22 TYPE 2 DIABETES MELLITUS WITH DIABETIC CHRONIC KIDNEY DISEASE: ICD-10-CM

## 2021-12-25 DIAGNOSIS — I25.10 ATHEROSCLEROTIC HEART DISEASE OF NATIVE CORONARY ARTERY WITHOUT ANGINA PECTORIS: ICD-10-CM

## 2021-12-25 DIAGNOSIS — N18.30 CHRONIC KIDNEY DISEASE, STAGE 3 UNSPECIFIED: ICD-10-CM

## 2021-12-25 DIAGNOSIS — E78.5 HYPERLIPIDEMIA, UNSPECIFIED: ICD-10-CM

## 2021-12-25 DIAGNOSIS — K21.9 GASTRO-ESOPHAGEAL REFLUX DISEASE WITHOUT ESOPHAGITIS: ICD-10-CM

## 2021-12-25 DIAGNOSIS — K58.9 IRRITABLE BOWEL SYNDROME WITHOUT DIARRHEA: ICD-10-CM

## 2021-12-25 DIAGNOSIS — I25.2 OLD MYOCARDIAL INFARCTION: ICD-10-CM

## 2021-12-25 DIAGNOSIS — Z79.82 LONG TERM (CURRENT) USE OF ASPIRIN: ICD-10-CM

## 2021-12-25 DIAGNOSIS — Z88.1 ALLERGY STATUS TO OTHER ANTIBIOTIC AGENTS STATUS: ICD-10-CM

## 2022-01-03 ENCOUNTER — APPOINTMENT (OUTPATIENT)
Dept: ORTHOPEDIC SURGERY | Facility: CLINIC | Age: 67
End: 2022-01-03
Payer: COMMERCIAL

## 2022-01-03 VITALS
BODY MASS INDEX: 22.82 KG/M2 | WEIGHT: 124 LBS | HEIGHT: 62 IN | DIASTOLIC BLOOD PRESSURE: 80 MMHG | HEART RATE: 81 BPM | TEMPERATURE: 97.9 F | SYSTOLIC BLOOD PRESSURE: 150 MMHG | OXYGEN SATURATION: 97 %

## 2022-01-03 PROCEDURE — 99024 POSTOP FOLLOW-UP VISIT: CPT

## 2022-01-03 RX ORDER — HYALURONATE SOD, CROSS-LINKED 30 MG/3 ML
30 SYRINGE (ML) INTRAARTICULAR
Qty: 2 | Refills: 0 | Status: COMPLETED | COMMUNITY
Start: 2020-10-16 | End: 2022-01-03

## 2022-01-03 RX ORDER — HYALURONATE SOD, CROSS-LINKED 30 MG/3 ML
30 SYRINGE (ML) INTRAARTICULAR
Qty: 2 | Refills: 0 | Status: COMPLETED | COMMUNITY
Start: 2019-11-18 | End: 2022-01-03

## 2022-01-03 RX ORDER — HYALURONATE SODIUM, STABILIZED 88 MG/4 ML
88 SYRINGE (ML) INTRAARTICULAR
Qty: 2 | Refills: 0 | Status: COMPLETED | COMMUNITY
Start: 2021-09-13 | End: 2021-11-01

## 2022-01-03 RX ORDER — HYALURONATE SODIUM, STABILIZED 88 MG/4 ML
88 SYRINGE (ML) INTRAARTICULAR
Qty: 2 | Refills: 0 | Status: COMPLETED | COMMUNITY
Start: 2021-10-28 | End: 2022-01-03

## 2022-01-03 RX ORDER — HYALURONATE SODIUM, STABILIZED 88 MG/4 ML
88 SYRINGE (ML) INTRAARTICULAR
Qty: 2 | Refills: 0 | Status: COMPLETED | COMMUNITY
Start: 2020-10-26 | End: 2022-01-03

## 2022-01-03 RX ORDER — OXYCODONE 5 MG/1
5 TABLET ORAL
Qty: 50 | Refills: 0 | Status: COMPLETED | COMMUNITY
Start: 2021-12-16 | End: 2022-01-03

## 2022-01-03 NOTE — HISTORY OF PRESENT ILLNESS
[___ Days Post Op] : post op day #[unfilled] [Procedure: ___] : status post [unfilled] [2] : the patient reports pain that is 2/10 in severity [Clean/Dry/Intact] : clean, dry and intact [Neuro Intact] : an unremarkable neurological exam [Vascular Intact] : ~T peripheral vascular exam normal [Chills] : no chills [Constipation] : no constipation [Diarrhea] : no diarrhea [Dysuria] : no dysuria [Fever] : no fever [Nausea] : no nausea [Vomiting] : no vomiting [Erythema] : not erythematous [Discharge] : absent of discharge [Swelling] : not swollen [Dehiscence] : not dehisced [de-identified] : Post op visit # 1: Right NUSRAT, DA\par Date of surgery: 12.16.2021 [de-identified] : 66M who is s/p R NUSRAT on 12/16. Has been doing very well and states his preop pain is mostly resolved. Only required Percocet x2 in the first week after discharge, and pain is currently controlled with only Tylenol. Has been taking Aspirin as directed. Has been doing home physical therapy. [de-identified] : General: NAD\par RLE:\par Incision CDI, no wound dehiscence, no drainage, no erythema\par Quad 5/5, hamstrings 5/5, EHL/FHL/GS/TA 5/5 [de-identified] : 66M who s/p R NUSRAT on 12/16, doing well [de-identified] : -Continue with Home PT and transition to outpatient PT\par -Continue Aspirin 81mg bid, completed at 30days postop\par -Tylenol prn\par -Wean assistive devices as tolerated\par -F/u in one month, obtain new R hip x-rays

## 2022-01-13 PROCEDURE — 86900 BLOOD TYPING SEROLOGIC ABO: CPT

## 2022-01-13 PROCEDURE — 76000 FLUOROSCOPY <1 HR PHYS/QHP: CPT

## 2022-01-13 PROCEDURE — 86803 HEPATITIS C AB TEST: CPT

## 2022-01-13 PROCEDURE — 83036 HEMOGLOBIN GLYCOSYLATED A1C: CPT

## 2022-01-13 PROCEDURE — 86850 RBC ANTIBODY SCREEN: CPT

## 2022-01-13 PROCEDURE — 82962 GLUCOSE BLOOD TEST: CPT

## 2022-01-13 PROCEDURE — 36415 COLL VENOUS BLD VENIPUNCTURE: CPT

## 2022-01-13 PROCEDURE — 85027 COMPLETE CBC AUTOMATED: CPT

## 2022-01-13 PROCEDURE — 86901 BLOOD TYPING SEROLOGIC RH(D): CPT

## 2022-01-13 PROCEDURE — 80048 BASIC METABOLIC PNL TOTAL CA: CPT

## 2022-01-13 PROCEDURE — 97116 GAIT TRAINING THERAPY: CPT

## 2022-01-13 PROCEDURE — 97161 PT EVAL LOW COMPLEX 20 MIN: CPT

## 2022-01-13 PROCEDURE — C1776: CPT

## 2022-01-13 PROCEDURE — 72170 X-RAY EXAM OF PELVIS: CPT

## 2022-01-31 ENCOUNTER — OUTPATIENT (OUTPATIENT)
Dept: OUTPATIENT SERVICES | Facility: HOSPITAL | Age: 67
LOS: 1 days | End: 2022-01-31
Payer: COMMERCIAL

## 2022-01-31 ENCOUNTER — APPOINTMENT (OUTPATIENT)
Dept: ORTHOPEDIC SURGERY | Facility: CLINIC | Age: 67
End: 2022-01-31
Payer: COMMERCIAL

## 2022-01-31 ENCOUNTER — RESULT REVIEW (OUTPATIENT)
Age: 67
End: 2022-01-31

## 2022-01-31 DIAGNOSIS — Z95.5 PRESENCE OF CORONARY ANGIOPLASTY IMPLANT AND GRAFT: Chronic | ICD-10-CM

## 2022-01-31 DIAGNOSIS — Z98.89 OTHER SPECIFIED POSTPROCEDURAL STATES: Chronic | ICD-10-CM

## 2022-01-31 DIAGNOSIS — Z98.890 OTHER SPECIFIED POSTPROCEDURAL STATES: Chronic | ICD-10-CM

## 2022-01-31 DIAGNOSIS — Q30.9 CONGENITAL MALFORMATION OF NOSE, UNSPECIFIED: Chronic | ICD-10-CM

## 2022-01-31 PROCEDURE — 73502 X-RAY EXAM HIP UNI 2-3 VIEWS: CPT | Mod: 26,RT

## 2022-01-31 PROCEDURE — 99024 POSTOP FOLLOW-UP VISIT: CPT

## 2022-01-31 PROCEDURE — 73502 X-RAY EXAM HIP UNI 2-3 VIEWS: CPT

## 2022-03-28 ENCOUNTER — OUTPATIENT (OUTPATIENT)
Dept: OUTPATIENT SERVICES | Facility: HOSPITAL | Age: 67
LOS: 1 days | End: 2022-03-28
Payer: COMMERCIAL

## 2022-03-28 ENCOUNTER — APPOINTMENT (OUTPATIENT)
Dept: ORTHOPEDIC SURGERY | Facility: CLINIC | Age: 67
End: 2022-03-28
Payer: COMMERCIAL

## 2022-03-28 ENCOUNTER — RESULT REVIEW (OUTPATIENT)
Age: 67
End: 2022-03-28

## 2022-03-28 DIAGNOSIS — Q30.9 CONGENITAL MALFORMATION OF NOSE, UNSPECIFIED: Chronic | ICD-10-CM

## 2022-03-28 DIAGNOSIS — Z98.89 OTHER SPECIFIED POSTPROCEDURAL STATES: Chronic | ICD-10-CM

## 2022-03-28 DIAGNOSIS — Z98.890 OTHER SPECIFIED POSTPROCEDURAL STATES: Chronic | ICD-10-CM

## 2022-03-28 DIAGNOSIS — Z95.5 PRESENCE OF CORONARY ANGIOPLASTY IMPLANT AND GRAFT: Chronic | ICD-10-CM

## 2022-03-28 PROCEDURE — 73502 X-RAY EXAM HIP UNI 2-3 VIEWS: CPT | Mod: 26,RT

## 2022-03-28 PROCEDURE — 99213 OFFICE O/P EST LOW 20 MIN: CPT

## 2022-03-28 PROCEDURE — 73502 X-RAY EXAM HIP UNI 2-3 VIEWS: CPT

## 2022-03-28 RX ORDER — GLIMEPIRIDE 1 MG/1
1 TABLET ORAL
Qty: 30 | Refills: 0 | Status: ACTIVE | COMMUNITY
Start: 2022-03-02

## 2022-03-28 RX ORDER — SITAGLIPTIN 50 MG/1
50 TABLET, FILM COATED ORAL
Qty: 30 | Refills: 0 | Status: ACTIVE | COMMUNITY
Start: 2022-02-25

## 2022-03-28 NOTE — PHYSICAL EXAM
[de-identified] : General appearance: well nourished and hydrated, pleasant, alert and oriented x 3, cooperative.  \par HEENT: normocephalic, EOM intact, wearing mask, external auditory canal clear.  \par Cardiovascular: no lower leg edema, no varicosities, dorsalis pedis pulses palpable and symmetric.  \par Lymphatics: no palpable lymphadenopathy, no lymphedema.  \par Neurologic: sensation is normal, no muscle weakness in upper or lower extremities, patella tendon reflexes present and symmetric.  \par Dermatologic: skin moist, warm, no rash.  \par Spine: cervical spine with normal lordosis and painless range of motion, thoracic spine with normal kyphosis and painless range of motion, lumbosacral spine with normal lordosis and painless range of motion.  \par Gait: normal.  \par \par Limb lengths: similar\par \par Right hip:\par - Swelling: none\par - Ecchymosis: none\par - Erythema: none\par - Wounds: healed anterior incision\par - Tenderness: none\par - ROM: 120 flexion, 0 extension, 15 adduction, 45 abduction, 15 internal rotation, 60 external rotation\par - CELIA: painless\par - FADIR: painless\par - Belle: negative\par - Stinchfield: negative\par - Flexor power: 5/5\par - Abductor power: 5/5 [de-identified] : Right hip XRs taken today demonstrate stable position of the components without evidence of mechanical complication.

## 2022-03-28 NOTE — DISCUSSION/SUMMARY
[de-identified] : 65y/o male 3.5mo s/p R NUSRAT, doing well\par - Cont OPT\par - HEP\par - RTC q Dec with repeat bilateral hip XRs or earlier as needed

## 2022-03-28 NOTE — HISTORY OF PRESENT ILLNESS
[de-identified] : R NUSRAT 12.16.2021\par 3/28/22: Post op visit # 3. \par 14 weeks, 4 days S/P total right hip arthroplasty.\par Mr Dominguez comes in today for the third post op visit. He has no complains today. He has been doing physical therapy 2x/week and feels great improvement over the course of it. He denies any pain. Usually hits his target of 10k steps/day. Would like to continue with OPT.\par \par 12/7/21: Returning with wife for re-evaluation. No improvement in right hip symptoms with PT. Cardiologist forbade NSAIDs, more due to CKD than CAD. CKD workup is ongoing; no identifiable cause noted so far. They report generally poor function with daily steps now around 3k, sharp spikes of pain somewhat unpredictably, and overall unacceptable quality of life. Would like to book NUSRAT as soon as possible.\par \par 11/16/21: 65y/o male p/w R >> L hip pain progressive for about 3 months. No injury or other inciting event. Pain localizes to the groin with radiation down the medial thigh and around the lateral hip. Worst exacerbating activity is rising from seated position. Walking is not too bad after getting started; today he walked here from [facility] without too much difficulty. Started PT in September and hasn't found it particularly helpful. Takes no pain medications. \par \par PMH sig for HTN, CAD s/p MI in 2010 managed with 1x stent, DM (last known A1c 5.9). 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

## 2022-03-31 NOTE — HISTORY OF PRESENT ILLNESS
[___ Days Post Op] : post op day #[unfilled] [Procedure: ___] : status post [unfilled] [2] : the patient reports pain that is 2/10 in severity [Clean/Dry/Intact] : clean, dry and intact [Neuro Intact] : an unremarkable neurological exam [Vascular Intact] : ~T peripheral vascular exam normal [Chills] : no chills [Constipation] : no constipation [Diarrhea] : no diarrhea [Dysuria] : no dysuria [Fever] : no fever [Nausea] : no nausea [Vomiting] : no vomiting [Erythema] : not erythematous [Discharge] : absent of discharge [Swelling] : not swollen [Dehiscence] : not dehisced [de-identified] : Post op visit # 2: Right NUSRAT, DA\par Date of surgery: 12.16.2021 [de-identified] : General: NAD\par RLE:\par Incision well healed and benign appearing\par Quad 5/5, hamstrings 5/5, EHL/FHL/GS/TA 5/5\par \par R hip ROM 0-100 flex, 15 ADD, 30 ABD, 15 IR, 40 ER. Good hip flexion and abduction strength [de-identified] : Weightbearing AP pelvis, and 2 additional views (frog lateral and false profile) of the right hip were interpreted by me and reviewed with the patient.\par \par Location of imaging: [facility]\par Date of exam: 1/31/22\par \par Right NUSRAT in unchanged position as compared to intraop imaging without evidence of mechanical complication.  [de-identified] : 66M s/p R NUSRAT on 12/16, doing well [de-identified] : Cont PT/HEP\par Cont activities as tolerated\par RTC 2mo with repeat right hip XRs [de-identified] : Post op visit # 3.  Date of surgery: 12.16.2021\par 14 weeks, 4 days S/P Right total hip arthroplasty

## 2022-03-31 NOTE — HISTORY OF PRESENT ILLNESS
[___ Days Post Op] : post op day #[unfilled] [Procedure: ___] : status post [unfilled] [2] : the patient reports pain that is 2/10 in severity [Clean/Dry/Intact] : clean, dry and intact [Neuro Intact] : an unremarkable neurological exam [Vascular Intact] : ~T peripheral vascular exam normal [Chills] : no chills [Constipation] : no constipation [Diarrhea] : no diarrhea [Dysuria] : no dysuria [Fever] : no fever [Nausea] : no nausea [Vomiting] : no vomiting [Erythema] : not erythematous [Discharge] : absent of discharge [Swelling] : not swollen [Dehiscence] : not dehisced [de-identified] : Post op visit # 2: Right NUSRAT, DA\par Date of surgery: 12.16.2021 [de-identified] : General: NAD\par RLE:\par Incision well healed and benign appearing\par Quad 5/5, hamstrings 5/5, EHL/FHL/GS/TA 5/5\par \par R hip ROM 0-100 flex, 15 ADD, 30 ABD, 15 IR, 40 ER. Good hip flexion and abduction strength [de-identified] : Weightbearing AP pelvis, and 2 additional views (frog lateral and false profile) of the right hip were interpreted by me and reviewed with the patient.\par \par Location of imaging: [facility]\par Date of exam: 1/31/22\par \par Right NUSRAT in unchanged position as compared to intraop imaging without evidence of mechanical complication.  [de-identified] : 66M s/p R NUSRAT on 12/16, doing well [de-identified] : Cont PT/HEP\par Cont activities as tolerated\par RTC 2mo with repeat right hip XRs [de-identified] : Post op visit # 3.  Date of surgery: 12.16.2021\par 14 weeks, 4 days S/P Right total hip arthroplasty

## 2022-08-18 ENCOUNTER — OUTPATIENT (OUTPATIENT)
Dept: OUTPATIENT SERVICES | Facility: HOSPITAL | Age: 67
LOS: 1 days | End: 2022-08-18
Payer: COMMERCIAL

## 2022-08-18 ENCOUNTER — APPOINTMENT (OUTPATIENT)
Dept: ORTHOPEDIC SURGERY | Facility: CLINIC | Age: 67
End: 2022-08-18

## 2022-08-18 ENCOUNTER — RESULT REVIEW (OUTPATIENT)
Age: 67
End: 2022-08-18

## 2022-08-18 VITALS
WEIGHT: 140 LBS | HEIGHT: 62 IN | DIASTOLIC BLOOD PRESSURE: 74 MMHG | HEART RATE: 81 BPM | TEMPERATURE: 98.2 F | OXYGEN SATURATION: 97 % | BODY MASS INDEX: 25.76 KG/M2 | SYSTOLIC BLOOD PRESSURE: 141 MMHG

## 2022-08-18 DIAGNOSIS — Z98.89 OTHER SPECIFIED POSTPROCEDURAL STATES: Chronic | ICD-10-CM

## 2022-08-18 DIAGNOSIS — Q30.9 CONGENITAL MALFORMATION OF NOSE, UNSPECIFIED: Chronic | ICD-10-CM

## 2022-08-18 DIAGNOSIS — Z95.5 PRESENCE OF CORONARY ANGIOPLASTY IMPLANT AND GRAFT: Chronic | ICD-10-CM

## 2022-08-18 DIAGNOSIS — Z86.39 PERSONAL HISTORY OF OTHER ENDOCRINE, NUTRITIONAL AND METABOLIC DISEASE: ICD-10-CM

## 2022-08-18 DIAGNOSIS — Z98.890 OTHER SPECIFIED POSTPROCEDURAL STATES: Chronic | ICD-10-CM

## 2022-08-18 PROCEDURE — 99213 OFFICE O/P EST LOW 20 MIN: CPT

## 2022-08-18 PROCEDURE — 73564 X-RAY EXAM KNEE 4 OR MORE: CPT

## 2022-08-18 PROCEDURE — 73564 X-RAY EXAM KNEE 4 OR MORE: CPT | Mod: 26,50

## 2022-08-19 PROBLEM — Z86.39 HISTORY OF HIGH CHOLESTEROL: Status: RESOLVED | Noted: 2022-08-19 | Resolved: 2022-08-19

## 2022-08-19 RX ORDER — SITAGLIPTIN AND METFORMIN HYDROCHLORIDE 100; 1000 MG/1; MG/1
TABLET, FILM COATED, EXTENDED RELEASE ORAL
Refills: 0 | Status: DISCONTINUED | COMMUNITY
End: 2022-08-19

## 2022-08-19 RX ORDER — PANTOPRAZOLE 40 MG/1
40 TABLET, DELAYED RELEASE ORAL DAILY
Qty: 30 | Refills: 0 | Status: COMPLETED | COMMUNITY
Start: 2021-12-16 | End: 2022-08-19

## 2022-08-19 NOTE — PHYSICAL EXAM
[de-identified] : The patient is a well developed, well nourished male in no apparent distress. He is alert and oriented X 3 with a pleasant mood and appropriate affect. \par \par On physical examination of the right knee, his ROM is 0-120 degrees. The patient walks with a normal gait and stands in neutral alignment. There is no effusion. No warmth or erythema is noted. The patella is tender to palpation medially and laterally. There is patellofemoral crepitus noted. The apprehension and grind tests are negative. The extensor mechanism is intact. There is no joint line tenderness. The Chauncey sign is absent. The Lachman and pivot shift tests are negative. There is no varus or valgus laxity at 0 or 30 degrees. No posterolateral or anteromedial laxity is noted. No masses are palpable. No other soft tissue or bony tenderness is noted. Quadriceps weakness is noted. Neurovascular function is intact.  \par \par On physical examination of the left knee,his ROM is 0-115 degrees range of motion. The patient walks with a normal gait and stands in neutral alignment. There is trace effusion. No warmth or erythema is noted. The patella is tender to palpation medially and laterally. There is patellofemoral crepitus noted. The apprehension and grind tests are negative. The extensor mechanism is intact. There is no joint line tenderness. The Chauncey sign is absent. The Lachman and pivot shift tests are negative. There is no varus or valgus laxity at 0 or 30 degrees. No posterolateral or anteromedial laxity is noted. No masses are palpable. No other soft tissue or bony tenderness is noted. There is some tenderness noted on palpation of the IT band. Quadriceps weakness is noted. Neurovascular function is intact.   [de-identified] : Radiographs of both knees performed today show advanced tricompartment DJD in both knees, worst in PF compartments

## 2022-08-19 NOTE — DISCUSSION/SUMMARY
[de-identified] : Andrew has symptomatic DJD in both knees. He has done well with HA injections in the past and we will order a new round. If unimproved he understands that he will ultimately require knee replacement in the future. all questions were answered He will call if any issues arise

## 2022-08-19 NOTE — HISTORY OF PRESENT ILLNESS
[de-identified] : Andrew returns today for evaluation of both knees. He has a long h.o DJD and has managed well with HA injections. He continues to walk more than 10,000steps per day. He had right hiip replacement with Dr Salgado in January and has recovered well from that. Both knees has been more stiff and achy. He has pain with stairs and hills. he denies any locking or buckling

## 2022-09-06 ENCOUNTER — APPOINTMENT (OUTPATIENT)
Dept: ORTHOPEDIC SURGERY | Facility: CLINIC | Age: 67
End: 2022-09-06

## 2022-09-06 PROCEDURE — 20610 DRAIN/INJ JOINT/BURSA W/O US: CPT | Mod: 50

## 2022-09-06 RX ORDER — HYALURONATE SODIUM, STABILIZED 88 MG/4 ML
88 SYRINGE (ML) INTRAARTICULAR
Qty: 2 | Refills: 0 | Status: DISCONTINUED | COMMUNITY
Start: 2022-08-29 | End: 2022-09-06

## 2022-09-06 RX ORDER — HYALURONATE SODIUM, STABILIZED 88 MG/4 ML
88 SYRINGE (ML) INTRAARTICULAR
Qty: 2 | Refills: 0 | Status: COMPLETED | COMMUNITY
Start: 2022-08-19 | End: 2022-08-30

## 2022-09-06 NOTE — PROCEDURE
[de-identified] : cc; Bilateral knee pain and stiffness\par DX: bilateral knee DJD \par \par Under strict sterile technique, both knees were prepped with Betadine. Using the superolateral approach, with the patient supine, a 4mL injection of Monovisc was administered intra-articularly into each knee. The patient tolerated the procedure well. The patient was instructed to avoid vigorous exercise for 48 hours and will apply ice to the knee for 20 minutes 2-3 times per day if discomfort occurs. Patient will return on an as needed basis. The patient will call if any questions or problems should arise. \par \par MonoVisc injection - Bilateral knee joints\par Lot #; [id number]\par Exp: 09-\par Man: [employer]\par NDC: [address]-74

## 2022-11-04 ENCOUNTER — APPOINTMENT (OUTPATIENT)
Dept: ORTHOPEDIC SURGERY | Facility: CLINIC | Age: 67
End: 2022-11-04

## 2022-12-06 ENCOUNTER — OUTPATIENT (OUTPATIENT)
Dept: OUTPATIENT SERVICES | Facility: HOSPITAL | Age: 67
LOS: 1 days | End: 2022-12-06
Payer: COMMERCIAL

## 2022-12-06 ENCOUNTER — APPOINTMENT (OUTPATIENT)
Dept: ORTHOPEDIC SURGERY | Facility: CLINIC | Age: 67
End: 2022-12-06

## 2022-12-06 ENCOUNTER — RESULT REVIEW (OUTPATIENT)
Age: 67
End: 2022-12-06

## 2022-12-06 VITALS
DIASTOLIC BLOOD PRESSURE: 89 MMHG | SYSTOLIC BLOOD PRESSURE: 139 MMHG | OXYGEN SATURATION: 95 % | HEIGHT: 62 IN | BODY MASS INDEX: 25.76 KG/M2 | HEART RATE: 97 BPM | WEIGHT: 140 LBS

## 2022-12-06 DIAGNOSIS — Z98.89 OTHER SPECIFIED POSTPROCEDURAL STATES: Chronic | ICD-10-CM

## 2022-12-06 DIAGNOSIS — M16.11 UNILATERAL PRIMARY OSTEOARTHRITIS, RIGHT HIP: ICD-10-CM

## 2022-12-06 DIAGNOSIS — Z98.890 OTHER SPECIFIED POSTPROCEDURAL STATES: Chronic | ICD-10-CM

## 2022-12-06 DIAGNOSIS — Q30.9 CONGENITAL MALFORMATION OF NOSE, UNSPECIFIED: Chronic | ICD-10-CM

## 2022-12-06 DIAGNOSIS — Z95.5 PRESENCE OF CORONARY ANGIOPLASTY IMPLANT AND GRAFT: Chronic | ICD-10-CM

## 2022-12-06 PROCEDURE — 99213 OFFICE O/P EST LOW 20 MIN: CPT

## 2022-12-06 PROCEDURE — 73521 X-RAY EXAM HIPS BI 2 VIEWS: CPT

## 2022-12-06 PROCEDURE — 73521 X-RAY EXAM HIPS BI 2 VIEWS: CPT | Mod: 26

## 2022-12-06 NOTE — ADDENDUM
[FreeTextEntry1] : Documented by Damaris Rodríguez acting as a scribe for Dr. Bob Salgado on 12/06/2022.

## 2022-12-06 NOTE — PHYSICAL EXAM
[de-identified] : General appearance: well nourished and hydrated, pleasant, alert and oriented x 3, cooperative. \par HEENT: normocephalic, EOM intact, wearing mask, external auditory canal clear.  \par Cardiovascular: no lower leg edema, no varicosities, dorsalis pedis pulses palpable and symmetric.  \par Lymphatics: no palpable lymphadenopathy, no lymphedema.  \par Neurologic: sensation is normal, no muscle weakness in upper or lower extremities, patella tendon reflexes present and symmetric.  \par Dermatologic: skin moist, warm, no rash.  \par Spine: cervical spine with normal lordosis and painless range of motion, thoracic spine with normal kyphosis and painless range of motion, lumbosacral spine with normal lordosis and painless range of motion.  No tenderness to palpation along midline spine and paraspinal musculature.  Sacroiliac joints nontender bilaterally. Negative SLR and crossed SLR tests bilaterally.\par Gait: normal. Nonantalgic, no assistive device\par \par Limb lengths: clinically equal\par \par Left hip:\par - Focal soft tissue swelling: none\par - Ecchymosis: none\par - Erythema: none\par - Wounds: none\par - Tenderness: none\par - ROM: \par   - Flexion: 110\par   - Extension: 0\par   - Adduction: 5\par   - Abduction: 25\par   - Internal rotation in 90 degrees of hip flexion: 5\par   - External rotation in 90 degrees of hip flexion: 35\par - CELIA: stiff\par - FADIR: stiff \par - Belle: positive\par - Stinchfield: negative\par - Flexor power: 5/5\par - Abductor power: 5/5\par \par Right hip: \par - Focal soft tissue swelling: none\par - Ecchymosis: none\par - Erythema: none\par - Wounds: well healed surgical incision which is benign appearing \par - Tenderness: none\par - ROM: \par   - Flexion: 120\par   - Extension: 0\par   - Adduction: 15\par   - Abduction: 35\par   - Internal rotation in 90 degrees of hip flexion: 20\par   - External rotation in 90 degrees of hip flexion: 15\par - CELIA: negative\par - FADIR: negative\par - Belle: negative\par - Stinchfield: negative\par - Flexor power: 5/5\par - Abductor power: 5/5 [de-identified] : AP pelvis and bilateral hip radiographs were obtained. These demonstrate a right NUSRAT in stable position as compared to previous imaging without evidence of mechanical complication. There also remains stable appearance of mild left hip OA without evidence of other deformity or osteonecrosis.

## 2022-12-06 NOTE — HISTORY OF PRESENT ILLNESS
[de-identified] : R NUSRAT 12.16.2021\par \par 12/06/2022: 67 y/o male presenting now 1 year s/p R NUSRAT. He reports that overall the right hip is doing well. He does get some occasional sensation of pain and stiffness along the right thigh that he attributes to muscular issues. He also gets occasional twinges of pain on the left hip. He is under treatment by Dr. Andrea and has been for the last 12 years for severe b/l knee OA, receiving serial injections. He is continuing to ambulate without an assistive device, walking for exercise, and not using any analgesics or anti-inflammatories. \par \par 3/28/22: Post op visit # 3. \par 14 weeks, 4 days S/P total right hip arthroplasty.\par Mr Dominguez comes in today for the third post op visit. He has no complains today. He has been doing physical therapy 2x/week and feels great improvement over the course of it. He denies any pain. Usually hits his target of 10k steps/day. Would like to continue with OPT.\par \par 12/7/21: Returning with wife for re-evaluation. No improvement in right hip symptoms with PT. Cardiologist forbade NSAIDs, more due to CKD than CAD. CKD workup is ongoing; no identifiable cause noted so far. They report generally poor function with daily steps now around 3k, sharp spikes of pain somewhat unpredictably, and overall unacceptable quality of life. Would like to book NUSRAT as soon as possible.\par \par 11/16/21: 67y/o male p/w R >> L hip pain progressive for about 3 months. No injury or other inciting event. Pain localizes to the groin with radiation down the medial thigh and around the lateral hip. Worst exacerbating activity is rising from seated position. Walking is not too bad after getting started; today he walked here from [facility] without too much difficulty. Started PT in September and hasn't found it particularly helpful. Takes no pain medications. \par \par PMH sig for HTN, CAD s/p MI in 2010 managed with 1x stent, DM (last known A1c 5.9). 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

## 2022-12-06 NOTE — DISCUSSION/SUMMARY
[de-identified] : 67 y/o male 1 year s/p right NUSRAT doing well with mild left hip OA and severe bilateral knee OA. \par - The hip replacement is doing well and I do not think requires any additional management at this time, nor does the relatively mild degree of left hip arthritis. \par - He is already under treatment by Dr. Andrea for bilateral knees and will continue to do so. He may become a candidate for TKA if symptoms become worse in the future. \par - I recommended that he carry on with hip and knee focused HEP. I provided him with a referral for PT. \par - Will continue to f/u annually with repeat b/l hip and knee XR or earlier as needed.

## 2022-12-06 NOTE — END OF VISIT
[FreeTextEntry3] : All medical record entries made by the Scribe were at my, Dr. Bob Salgado, direction and personally dictated by me on 12/06/2022. I have reviewed the chart and agree that the record accurately reflects my personal performance of the history, physical exam, assessment and plan. I have also personally directed, reviewed, and agreed with the chart.

## 2023-04-18 ENCOUNTER — OUTPATIENT (OUTPATIENT)
Dept: OUTPATIENT SERVICES | Facility: HOSPITAL | Age: 68
LOS: 1 days | End: 2023-04-18
Payer: COMMERCIAL

## 2023-04-18 ENCOUNTER — RESULT REVIEW (OUTPATIENT)
Age: 68
End: 2023-04-18

## 2023-04-18 ENCOUNTER — APPOINTMENT (OUTPATIENT)
Dept: ORTHOPEDIC SURGERY | Facility: CLINIC | Age: 68
End: 2023-04-18
Payer: COMMERCIAL

## 2023-04-18 VITALS
BODY MASS INDEX: 25.76 KG/M2 | WEIGHT: 140 LBS | HEART RATE: 77 BPM | HEIGHT: 62 IN | SYSTOLIC BLOOD PRESSURE: 141 MMHG | TEMPERATURE: 98.1 F | DIASTOLIC BLOOD PRESSURE: 85 MMHG | OXYGEN SATURATION: 95 %

## 2023-04-18 DIAGNOSIS — Z98.89 OTHER SPECIFIED POSTPROCEDURAL STATES: Chronic | ICD-10-CM

## 2023-04-18 DIAGNOSIS — Q30.9 CONGENITAL MALFORMATION OF NOSE, UNSPECIFIED: Chronic | ICD-10-CM

## 2023-04-18 DIAGNOSIS — Z98.890 OTHER SPECIFIED POSTPROCEDURAL STATES: Chronic | ICD-10-CM

## 2023-04-18 DIAGNOSIS — Z95.5 PRESENCE OF CORONARY ANGIOPLASTY IMPLANT AND GRAFT: Chronic | ICD-10-CM

## 2023-04-18 PROCEDURE — 99213 OFFICE O/P EST LOW 20 MIN: CPT

## 2023-04-18 PROCEDURE — 73521 X-RAY EXAM HIPS BI 2 VIEWS: CPT | Mod: 26

## 2023-04-18 PROCEDURE — 73564 X-RAY EXAM KNEE 4 OR MORE: CPT | Mod: 26,50

## 2023-04-18 PROCEDURE — 73521 X-RAY EXAM HIPS BI 2 VIEWS: CPT

## 2023-04-18 PROCEDURE — 73564 X-RAY EXAM KNEE 4 OR MORE: CPT

## 2023-04-19 RX ORDER — ONDANSETRON 4 MG/1
4 TABLET, ORALLY DISINTEGRATING ORAL EVERY 6 HOURS
Qty: 20 | Refills: 2 | Status: COMPLETED | COMMUNITY
Start: 2021-12-22 | End: 2023-04-19

## 2023-04-19 RX ORDER — SCOPOLAMINE 1.5 MG/1
1 PATCH, EXTENDED RELEASE TRANSDERMAL
Qty: 5 | Refills: 0 | Status: COMPLETED | COMMUNITY
Start: 2021-12-22 | End: 2023-04-19

## 2023-04-19 RX ORDER — MELOXICAM 7.5 MG/1
7.5 TABLET ORAL DAILY
Qty: 30 | Refills: 2 | Status: COMPLETED | COMMUNITY
Start: 2021-11-16 | End: 2023-04-19

## 2023-04-19 RX ORDER — ASPIRIN 81 MG/1
81 TABLET ORAL
Qty: 60 | Refills: 0 | Status: COMPLETED | COMMUNITY
Start: 2021-12-16 | End: 2023-04-19

## 2023-04-19 NOTE — DISCUSSION/SUMMARY
[de-identified] : Andrew has symptomatic left hip DJD. He will continue to bowl as tolerated over the summer. He will check with his PMD as to whether he is allowed to take Mobic. We will see him back in the summer for HA injections in his knees. He understands that he will ultimately require left hip replacement and bilateral knee replacement. ALl questions were answered. He will call if any issues arise

## 2023-04-19 NOTE — HISTORY OF PRESENT ILLNESS
[de-identified] : Andrew returns today with a new issue with his left hip. He has longstanding bilateral PF DJD. He has managed well with HA injections and continues to walk more that 10K steps per day. 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 He has pain after he bowls. He denies any locking or catching. He had right THR with Dr GASPAR several years ago.

## 2023-04-19 NOTE — PHYSICAL EXAM
[de-identified] : The patient is a well developed, well nourished male in no apparent distress. He is alert and oriented X 3 with a pleasant mood and appropriate affect. \par \par On physical examination of the right knee, his ROM is 0-120 degrees. The patient walks with a normal gait and stands in neutral alignment. There is no effusion. No warmth or erythema is noted. The patella is tender to palpation medially and laterally. There is patellofemoral crepitus noted. The apprehension and grind tests are negative. The extensor mechanism is intact. There is no joint line tenderness. The Chauncey sign is absent. The Lachman and pivot shift tests are negative. There is no varus or valgus laxity at 0 or 30 degrees. No posterolateral or anteromedial laxity is noted. No masses are palpable. No other soft tissue or bony tenderness is noted. Quadriceps weakness is noted. Neurovascular function is intact.  \par \par On physical examination of the left knee,his ROM is 0-115 degrees range of motion. The patient walks with a normal gait and stands in neutral alignment. There is trace effusion. No warmth or erythema is noted. The patella is tender to palpation medially and laterally. There is patellofemoral crepitus noted. The apprehension and grind tests are negative. The extensor mechanism is intact. There is no joint line tenderness. The Chauncey sign is absent. The Lachman and pivot shift tests are negative. There is no varus or valgus laxity at 0 or 30 degrees. No posterolateral or anteromedial laxity is noted. No masses are palpable. No other soft tissue or bony tenderness is noted. There is some tenderness noted on palpation of the IT band. Quadriceps weakness is noted. Neurovascular function is intact.  \par \par on exam of his left hip, he has decreased internal and external rotation with pain. Strength is 5/5 [de-identified] : Radiographs of both hips show right THR and left hip DJD \par Radiographs of both knees show severe PF DJD in both knees

## 2023-04-26 DIAGNOSIS — M17.0 BILATERAL PRIMARY OSTEOARTHRITIS OF KNEE: ICD-10-CM

## 2023-04-26 DIAGNOSIS — M16.12 UNILATERAL PRIMARY OSTEOARTHRITIS, LEFT HIP: ICD-10-CM

## 2023-04-26 DIAGNOSIS — M25.462 EFFUSION, LEFT KNEE: ICD-10-CM

## 2023-04-26 DIAGNOSIS — M25.461 EFFUSION, RIGHT KNEE: ICD-10-CM

## 2023-04-26 DIAGNOSIS — M25.562 PAIN IN LEFT KNEE: ICD-10-CM

## 2023-04-26 DIAGNOSIS — M25.852 OTHER SPECIFIED JOINT DISORDERS, LEFT HIP: ICD-10-CM

## 2023-04-26 DIAGNOSIS — M25.561 PAIN IN RIGHT KNEE: ICD-10-CM

## 2023-04-26 DIAGNOSIS — M51.36 OTHER INTERVERTEBRAL DISC DEGENERATION, LUMBAR REGION: ICD-10-CM

## 2023-04-26 DIAGNOSIS — M25.551 PAIN IN RIGHT HIP: ICD-10-CM

## 2023-04-26 DIAGNOSIS — M25.552 PAIN IN LEFT HIP: ICD-10-CM

## 2023-07-20 ENCOUNTER — APPOINTMENT (OUTPATIENT)
Dept: ORTHOPEDIC SURGERY | Facility: CLINIC | Age: 68
End: 2023-07-20
Payer: MEDICARE

## 2023-07-20 PROCEDURE — 20610 DRAIN/INJ JOINT/BURSA W/O US: CPT | Mod: 50

## 2023-07-20 NOTE — PROCEDURE
[de-identified] : cc: bilateral knee pain and stiffness\par DX: bilateral knee PF DJD \par \par Under strict sterile technique,both knees were  prepped with Chloraprep. Using the superolateral approach, with the patient supine, a 4mL injection of Monovisc was administered intra-articularly into each knee. The patient tolerated the procedure well. The patient was instructed to avoid vigorous exercise for 48 hours and will apply ice to the knee for 20 minutes 2-3 times per day if discomfort occurs. Patient will return on an as needed basis. The patient will call if any questions or problems should arise. \par \par MonoVisc injection - Bilateral knee joints\par Lot #: [id number]\par Exp:02-\par Man:[employer]\par NDC:  [address]-05

## 2023-10-02 ENCOUNTER — TRANSCRIPTION ENCOUNTER (OUTPATIENT)
Age: 68
End: 2023-10-02

## 2023-11-03 ENCOUNTER — APPOINTMENT (OUTPATIENT)
Dept: ORTHOPEDIC SURGERY | Facility: CLINIC | Age: 68
End: 2023-11-03
Payer: MEDICARE

## 2023-11-03 ENCOUNTER — RESULT REVIEW (OUTPATIENT)
Age: 68
End: 2023-11-03

## 2023-11-03 ENCOUNTER — OUTPATIENT (OUTPATIENT)
Dept: OUTPATIENT SERVICES | Facility: HOSPITAL | Age: 68
LOS: 1 days | End: 2023-11-03
Payer: MEDICARE

## 2023-11-03 VITALS
BODY MASS INDEX: 25.21 KG/M2 | WEIGHT: 137 LBS | SYSTOLIC BLOOD PRESSURE: 151 MMHG | DIASTOLIC BLOOD PRESSURE: 76 MMHG | HEART RATE: 80 BPM | OXYGEN SATURATION: 95 % | HEIGHT: 62 IN

## 2023-11-03 DIAGNOSIS — Z98.89 OTHER SPECIFIED POSTPROCEDURAL STATES: Chronic | ICD-10-CM

## 2023-11-03 DIAGNOSIS — Q30.9 CONGENITAL MALFORMATION OF NOSE, UNSPECIFIED: Chronic | ICD-10-CM

## 2023-11-03 DIAGNOSIS — Z98.890 OTHER SPECIFIED POSTPROCEDURAL STATES: Chronic | ICD-10-CM

## 2023-11-03 DIAGNOSIS — Z47.1 AFTERCARE FOLLOWING JOINT REPLACEMENT SURGERY: ICD-10-CM

## 2023-11-03 DIAGNOSIS — Z95.5 PRESENCE OF CORONARY ANGIOPLASTY IMPLANT AND GRAFT: Chronic | ICD-10-CM

## 2023-11-03 DIAGNOSIS — Z96.641 AFTERCARE FOLLOWING JOINT REPLACEMENT SURGERY: ICD-10-CM

## 2023-11-03 PROCEDURE — 73521 X-RAY EXAM HIPS BI 2 VIEWS: CPT

## 2023-11-03 PROCEDURE — 20610 DRAIN/INJ JOINT/BURSA W/O US: CPT | Mod: LT

## 2023-11-03 PROCEDURE — 73521 X-RAY EXAM HIPS BI 2 VIEWS: CPT | Mod: 26

## 2023-11-03 PROCEDURE — 99214 OFFICE O/P EST MOD 30 MIN: CPT | Mod: 25

## 2023-11-03 PROCEDURE — 73564 X-RAY EXAM KNEE 4 OR MORE: CPT

## 2023-11-03 PROCEDURE — 73564 X-RAY EXAM KNEE 4 OR MORE: CPT | Mod: 26,50

## 2023-11-03 RX ORDER — MELOXICAM 7.5 MG/1
7.5 TABLET ORAL DAILY
Qty: 30 | Refills: 2 | Status: ACTIVE | COMMUNITY
Start: 2023-11-03 | End: 1900-01-01

## 2023-12-15 ENCOUNTER — APPOINTMENT (OUTPATIENT)
Dept: ORTHOPEDIC SURGERY | Facility: CLINIC | Age: 68
End: 2023-12-15
Payer: MEDICARE

## 2023-12-15 VITALS
HEIGHT: 62 IN | HEART RATE: 83 BPM | SYSTOLIC BLOOD PRESSURE: 126 MMHG | WEIGHT: 136 LBS | DIASTOLIC BLOOD PRESSURE: 84 MMHG | BODY MASS INDEX: 25.03 KG/M2 | OXYGEN SATURATION: 95 %

## 2023-12-15 DIAGNOSIS — M16.12 UNILATERAL PRIMARY OSTEOARTHRITIS, LEFT HIP: ICD-10-CM

## 2023-12-15 PROCEDURE — 99213 OFFICE O/P EST LOW 20 MIN: CPT

## 2023-12-19 NOTE — HISTORY OF PRESENT ILLNESS
[de-identified] : R NUSRAT 12.16.2021  12/15/23 68M presenting for follow up evaluation of right total hip arthroplasty as well as left hip osteoarthritis, left trochanteric bursitis, and bilateral knee osteoarthritis. Pt was last seen in office on November 3rd and was administered a left hip trochanter bursa injection. Pt states he's got good relief from that injection today and continues to get goof relief. He denies any pain of his knees. States his hip occasionally causes him slight pain when he sits for an extended period of time. Overall pt is not complaining today of any primary pain from his hip or knees but states his most recent pain generator is his left arm. He recently went to the ER and was told that his shoulder pain is likely of nerve origin.   11/3/2023: 69 y/o M presents about 2 years status post right total hip arthroplasty. Right hip is doing well. He presents for followup of left hip osteoarthritis as well as bilateral knee osteoarthritis. He's continuing to follow up with Dr. Andrea for his bilateral knees (serial HA injections). He states today that his primary pain generator is his left hip. He's been having symptoms for the last six months or so, progressively getting worse, localized to the lateral hip and anterior groin. He notes difficulty primarily with changing position from sitting to standing. He's currently participating in PT as well as taking tylenol. The left hip symptoms are still not anywhere near so bad as his right hip pre-NUSRAT. He would like to discuss further treatment for his left hip today.   12/06/2022: 67 y/o male presenting now 1 year s/p R NUSRAT. He reports that overall the right hip is doing well. He does get some occasional sensation of pain and stiffness along the right thigh that he attributes to muscular issues. He also gets occasional twinges of pain on the left hip. He is under treatment by Dr. Andrea and has been for the last 12 years for severe b/l knee OA, receiving serial injections. He is continuing to ambulate without an assistive device, walking for exercise, and not using any analgesics or anti-inflammatories.   3/28/22: Post op visit # 3.  14 weeks, 4 days S/P total right hip arthroplasty. Mr Dominguez comes in today for the third post op visit. He has no complains today. He has been doing physical therapy 2x/week and feels great improvement over the course of it. He denies any pain. Usually hits his target of 10k steps/day. Would like to continue with OPT.  12/7/21: Returning with wife for re-evaluation. No improvement in right hip symptoms with PT. Cardiologist forbade NSAIDs, more due to CKD than CAD. CKD workup is ongoing; no identifiable cause noted so far. They report generally poor function with daily steps now around 3k, sharp spikes of pain somewhat unpredictably, and overall unacceptable quality of life. Would like to book NUSRAT as soon as possible.  11/16/21: 65y/o male p/w R >> L hip pain progressive for about 3 months. No injury or other inciting event. Pain localizes to the groin with radiation down the medial thigh and around the lateral hip. Worst exacerbating activity is rising from seated position. Walking is not too bad after getting started; today he walked here from [facility] without too much difficulty. Started PT in September and hasn't found it particularly helpful. Takes no pain medications.   PMH sig for HTN, CAD s/p MI in 2010 managed with 1x stent, DM (last known A1c 5.9). 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

## 2023-12-19 NOTE — PHYSICAL EXAM
[de-identified] : General appearance: well nourished and hydrated, pleasant, alert and oriented x 3, cooperative.   HEENT: normocephalic, EOM intact, wearing mask, external auditory canal clear.   Cardiovascular: no lower leg edema, no varicosities, dorsalis pedis pulses palpable and symmetric.   Lymphatics: no palpable lymphadenopathy, no lymphedema.   Neurologic: sensation is normal, no muscle weakness in upper or lower extremities, patella tendon reflexes present and symmetric.   Dermatologic: skin moist, warm, no rash.   Spine: cervical spine with normal lordosis and painless range of motion, thoracic spine with normal kyphosis and painless range of motion, lumbosacral spine with normal lordosis and painless range of motion.  No tenderness to palpation along midline spine and paraspinal musculature.  Sacroiliac joints nontender bilaterally. Negative SLR and crossed SLR tests bilaterally. Gait: presents without the use of an assistive device, demonstrates a stable non antalgic gait pattern with normal stride length and cayla and no length  Limb lengths: clinically equal  Left hip: - Focal soft tissue swelling: none - Ecchymosis: none - Erythema: none - Wounds: none - Tenderness: mild tenderness to palpation at the lateral aspect of the greater trochanter, no tenderness to palpation at the anterior aspect of the groin - ROM:    - Flexion: 90   - Extension: 0   - Adduction: 10   - Abduction: 30   - Internal rotation in 90 degrees of hip flexion: 10   - External rotation in 90 degrees of hip flexion: 20 - CELIA: stiff but painless - FADIR: stiff but painless - Belle: negative - Stinchfield: elicits posteriolateral hip and proximal thigh pain - Flexor power: 4+/5 - Abductor power: 5/5  Left shoulder: - demonstrates full active and symmetric elevation - external rotation: 35, which is symmetric - internal rotation to T6 which is better than the contralateral T8 - normal painless strength of resisted active elvation - external rotation and internal rotation 5/5 and symmetric to the contralateral side - he has no outward shouler deformity but he does have some subacromial tenderness to palpation at the posterolateral aspect

## 2023-12-19 NOTE — END OF VISIT
[FreeTextEntry3] :  All medical record entries made by the Scribe were at my, Dr. Bob Salgado's, direction and personally dictated by me on 12/15/2023. I have reviewed the chart and agree that the record accurately reflects my personal performance of the history, physical exam, assessment and plan. I have also personally directed, reviewed, and agreed with the chart.

## 2023-12-19 NOTE — DISCUSSION/SUMMARY
[de-identified] : 68M with well functioning right total hip arthroplasty, mild and at this time asymptomatic left hip osteoarthritis and mostly resolved episode of left trochanteric bursitis as well as one week onset of left cervical radiculopathy. Also with possible elements of superimposed rotator cuff tendinopathy.  - He's has good response thus far to the conservative management for the left trochanteric bursitis. - We did review that bursitis can be recurrent issue and so I strongly recommend that he complete his course of physical therapy and then continue on with an appropriate home exercise program targeted at core and hip mobilization. - We reviewed that further or serial cortisone injections to the left trochanteric bursa should be avoided if possible. - However, if symptoms do recur then I think that potentially considering a platelet rich plasma injection may also be reasonable. - Regarding the left shoulder and arm complaints, this does appear to be more radicular to me than organic at the shoulder. - We will provide him with a referral to pain management to consider injection modalities as needed. - We'll set the next follow up for 1 year with repeat bilateral hip x-rays earlier as needed for any new or worsening symptoms.

## 2023-12-20 ENCOUNTER — APPOINTMENT (OUTPATIENT)
Dept: RADIOLOGY | Facility: CLINIC | Age: 68
End: 2023-12-20

## 2023-12-20 ENCOUNTER — APPOINTMENT (OUTPATIENT)
Dept: PHYSICAL MEDICINE AND REHAB | Facility: CLINIC | Age: 68
End: 2023-12-20
Payer: MEDICARE

## 2023-12-20 VITALS
WEIGHT: 136 LBS | HEIGHT: 62 IN | OXYGEN SATURATION: 96 % | HEART RATE: 74 BPM | SYSTOLIC BLOOD PRESSURE: 136 MMHG | BODY MASS INDEX: 25.03 KG/M2 | DIASTOLIC BLOOD PRESSURE: 87 MMHG

## 2023-12-20 DIAGNOSIS — Z86.79 PERSONAL HISTORY OF OTHER DISEASES OF THE CIRCULATORY SYSTEM: ICD-10-CM

## 2023-12-20 DIAGNOSIS — M48.02 SPINAL STENOSIS, CERVICAL REGION: ICD-10-CM

## 2023-12-20 DIAGNOSIS — Z87.448 PERSONAL HISTORY OF OTHER DISEASES OF URINARY SYSTEM: ICD-10-CM

## 2023-12-20 DIAGNOSIS — Z86.39 PERSONAL HISTORY OF OTHER ENDOCRINE, NUTRITIONAL AND METABOLIC DISEASE: ICD-10-CM

## 2023-12-20 PROCEDURE — 99204 OFFICE O/P NEW MOD 45 MIN: CPT | Mod: 25

## 2023-12-20 PROCEDURE — 98926 OSTEOPATH MANJ 3-4 REGIONS: CPT

## 2023-12-20 PROCEDURE — 20552 NJX 1/MLT TRIGGER POINT 1/2: CPT

## 2023-12-20 NOTE — ASSESSMENT
[FreeTextEntry1] : Patient with cervical radiculopathy versus myofascial strain.  He is only 10 days out and has had minimal treatment and only  tramadol which she took once.  He cannot take NSAIDs due to stomach and heart issues.  Plan as follows:  - Flexion-extension x-ray of the cervical spine  -Tylenol 1000 mg 4 times a day as needed  -Follow-up in 2 weeks may obtain MRI if no improvement  I have spent greater than 45 minutes preparing to see the patient, collecting relevant history, performing a thorough history and physical examination, counseling the patient regarding my findings ordering the appropriate therapies and tests, communicating with other relevant healthcare professionals, documenting my encounter and coordinating care.

## 2023-12-20 NOTE — ADDENDUM
[FreeTextEntry1] : Tang Armijo DO, FAAPMR Attending Physician, Interventional Pain Medicine  Department of Physical Medicine and Rehabilitation [facility] | [place] [address] [address] [place] [id number] Email: [email or web address]

## 2023-12-20 NOTE — HISTORY OF PRESENT ILLNESS
[___ days] : [unfilled] day(s) ago [3] : a current pain level of 3/10 [1] : a minimum pain level of 1/10 [8] : a maximum pain level of 8/10 [Sharp] : sharp [Aching] : aching [Throbbing] : throbbing [Arm] : arm [Laying] : laying [Sitting] : sitting [Standing] : standing [Walking] : walking [Improved] : have improved [Medications] : medications [FreeTextEntry1] : Referring Physician: Dr. Bob Salgado  12/20/2023 Mr. HUANG GREGORY is a very pleasant 68-year male who comes in for evaluation of Neck Pain that has been ongoing for10 days without any specific injury or inciting event. Patient has tried tramadol which did not help relief. The pain is located primarily neck pain radiating to the left upper back/ arm intermittent and described as sharp, achy, throbbing. The pain is rated as 3/10 and ranges from 1-8/10. The patient's symptoms are aggravated by waking up in the morning, any activities and alleviated by Tylenol Arthritis. The patient is retired. The patient experiences night pain, numbness/tingling denies any weakness, or bowel/bladder dysfunction. The patient has no other complaints at this time.    [FreeTextEntry4] : Tylenol

## 2023-12-20 NOTE — PROCEDURE
[de-identified] : Trigger point injections were performed to the left trapezius levator scapula and cervical paraspinal muscles.  The area was prepped with chlorhexidine solution.  5 areas were marked around the muscles noted above.  Each area was entered with a 25-gauge needle and injected in with a solution of 2.5 cc of 0.5% ropivacaine and 2.5 cc of  normal saline and 5 cc of 1% lidocaine   after the procedure osteopathic manipulation was performed to the bilateral cervical spine cervical cranial area and bilateral shoulders and thoracic inlet.  Patient tolerated procedure well postprocedure pain was 2 out of 10

## 2023-12-20 NOTE — PHYSICAL EXAM
[FreeTextEntry1] :  Gen: A+O x 3 in NAD Psych: Normal mood and affect. Responds appropriately to commands  Eyes: Anicteric. No discharge. EOMI. Resp: Breathing unlabored  CV: Well Perfused Ext: No c/c/e  Skin: No lesions noted    Gait: Non antalgic, normal reciprocating heel to toe, able to stand on toes and heels.  Tandem gait intact  Negative romberg    Stance: No Trendelenburg sign present with single leg stance     Neuro:   Tone: Normal. No clonus.  Sensation: Grossly intact to light touch and pinprick bilateral lower limbs. Proprioception: Intact at big toes bilaterally.  Reflexes: 2+  symmetric knee jerk, medial hamstring, ankle jerk BR and triceps. Plantars downgoing bilaterally.    MMT:  5/5 in b/l lower extremities  and upper extremities     Spine:   Inspection:  No visual or normalities normal lordosis is maintained  Palpation:  + tenderness to palpation along the cervical paraspinal muscular trigger trapezius area skin lesions or occipital area L>R   Cervical  ROM: Flexion, extension, side-bending, rotation, limited in most planes  pain with lateral flexion  pain with oblique extension  pain with lateral rotation       Special Tests:   - Spurling's negative bilaterally  -Axial loading of the cervical spine is negative bilaterally  -Yosef negative bilaly   -Addisons negative bilaterally   -Normal dynamic and static balance   -Hoffmans negative bilaterally   -Cervical axial loading negative bilaterally

## 2024-01-03 ENCOUNTER — APPOINTMENT (OUTPATIENT)
Dept: PHYSICAL MEDICINE AND REHAB | Facility: CLINIC | Age: 69
End: 2024-01-03
Payer: MEDICARE

## 2024-01-03 VITALS
WEIGHT: 138 LBS | HEART RATE: 73 BPM | OXYGEN SATURATION: 96 % | SYSTOLIC BLOOD PRESSURE: 137 MMHG | BODY MASS INDEX: 25.4 KG/M2 | DIASTOLIC BLOOD PRESSURE: 84 MMHG | HEIGHT: 62 IN

## 2024-01-03 DIAGNOSIS — M47.812 SPONDYLOSIS W/OUT MYELOPATHY OR RADICULOPATHY, CERVICAL REGION: ICD-10-CM

## 2024-01-03 DIAGNOSIS — M47.22 OTHER SPONDYLOSIS WITH RADICULOPATHY, CERVICAL REGION: ICD-10-CM

## 2024-01-03 PROCEDURE — 99214 OFFICE O/P EST MOD 30 MIN: CPT

## 2024-01-04 NOTE — DATA REVIEWED
[Plain X-Rays] : plain X-Rays [FreeTextEntry1] : Multilevel moderate cervical spondylosis worst at C5-6.With flexion, there is mild anterolisthesis of C3 on C4 and C4 on C5, which reduces in extension.

## 2024-01-04 NOTE — PHYSICAL EXAM
[FreeTextEntry1] :  Gen: A+O x 3 in NAD Psych: Normal mood and affect. Responds appropriately to commands  Eyes: Anicteric. No discharge. EOMI. Resp: Breathing unlabored  CV: Well Perfused Ext: No c/c/e  Skin: No lesions noted    Gait: Non antalgic, normal reciprocating heel to toe, able to stand on toes and heels.  Tandem gait intact  Negative romberg    Stance: No Trendelenburg sign present with single leg stance     Neuro:   Tone: Normal. No clonus.  Sensation: Grossly intact to light touch and pinprick bilateral lower limbs. Proprioception: Intact at big toes bilaterally.  Reflexes: 2+  symmetric knee jerk, medial hamstring, ankle jerk BR and triceps. Plantars downgoing bilaterally.    MMT:  5/5 in b/l lower extremities  and upper extremities     Spine:   Inspection:  No visual or normalities normal lordosis is maintained  Palpation:  Improved  tenderness to palpation along the cervical paraspinal muscular trigger trapezius area skin lesions or occipital area L>R   Cervical  ROM: Flexion, extension, side-bending, rotation, limited in most planes  pain with lateral flexion  pain with oblique extension  pain with lateral rotation       Special Tests:   - Spurling's negative bilaterally  -Axial loading of the cervical spine is negative bilaterally  -Yosef negative bilaly   -Addisons negative bilaterally   -Normal dynamic and static balance   -Hoffmans negative bilaterally   -Cervical axial loading negative bilaterally

## 2024-01-04 NOTE — HISTORY OF PRESENT ILLNESS
[FreeTextEntry1] : Interval 1/4/2024:  Patient presents today for follow-up.  At her last visit about 2 weeks ago patient underwent trigger point injections to the bilateral trapezius and paraspinal muscles as well as levator scapulae followed by osteopathic manipulation.  He has full resolution of his neck pain.  He did experience 2 days of soreness afterwards.  He is happy with the overall results.  Continues to manage his arthritis in his shoulders and lower back.  Patient scribes 90% improvement

## 2024-01-04 NOTE — PROCEDURE
[de-identified] : Osteopathic manipulation was performed to the bilateral cervical spine cervical cranial area and bilateral shoulders and thoracic inlet.  Patient tolerated procedure well post-procedure pain was 0 out of 10

## 2024-01-04 NOTE — ASSESSMENT
[FreeTextEntry1] : Patient with cervical spondylosis.  I reviewed the x-ray with the patient and showed him the multilevel uncovertebral and facet hypertrophy in his cervical spine.  I explained to him that this can be managed in several ways including surgery interventions and medications.  However, despite the severity of his cervical spondylosis.  He responded very well to conservative treatment with trigger point injections and manipulation.  I provided the patient with physical therapy prescription home exercises.  I advised him to return to me as needed and that I am happy to continue to support him in terms of his cervical spine and any other issues that he has with his knees low back shoulders etc.  Osteopathic manipulation was repeated today as above.  Patient did very well with the procedure.  All questions asked and answered.  I have spent greater than 30 minutes preparing to see the patient, collecting relevant history, performing a thorough history and physical examination, counseling the patient regarding my findings ordering the appropriate therapies and tests, communicating with other relevant healthcare professionals, documenting my encounter and coordinating care.

## 2024-01-04 NOTE — ADDENDUM
[FreeTextEntry1] : Tang Armijo DO, FAAPMR Attending Physician, Interventional Pain Medicine  Department of Physical Medicine and Rehabilitation [facility] | [place] [address] [address] [place] [id number] Email: [email or web address]

## 2024-02-29 ENCOUNTER — APPOINTMENT (OUTPATIENT)
Dept: PHYSICAL MEDICINE AND REHAB | Facility: CLINIC | Age: 69
End: 2024-02-29
Payer: MEDICARE

## 2024-02-29 VITALS
BODY MASS INDEX: 25.4 KG/M2 | DIASTOLIC BLOOD PRESSURE: 84 MMHG | WEIGHT: 138 LBS | HEART RATE: 75 BPM | SYSTOLIC BLOOD PRESSURE: 152 MMHG | OXYGEN SATURATION: 96 % | HEIGHT: 62 IN

## 2024-02-29 DIAGNOSIS — M70.62 TROCHANTERIC BURSITIS, LEFT HIP: ICD-10-CM

## 2024-02-29 DIAGNOSIS — M19.041 PRIMARY OSTEOARTHRITIS, RIGHT HAND: ICD-10-CM

## 2024-02-29 PROCEDURE — 99214 OFFICE O/P EST MOD 30 MIN: CPT

## 2024-02-29 RX ORDER — DICLOFENAC SODIUM 1% 10 MG/G
1 GEL TOPICAL
Qty: 100 | Refills: 2 | Status: ACTIVE | COMMUNITY
Start: 2023-11-03 | End: 1900-01-01

## 2024-02-29 NOTE — ASSESSMENT
[FreeTextEntry1] :  Patient with osteoarthritis of the cervical spine bilateral hips status post right arthroplasty complaining of left hip pain today most likely bursitis however there could be elements of intra-articular disease as well.  Will hold off on steroid injections moving forward per Ortho preference.  I have communicated with 's team and we will proceed with plasma injections to the left hip.  I will obtain MRI prior to staging the PRP injection in order to understand all of the structures involved will address the bursa possibly the glue medius tendon depending on the results of the MRI as well as possible intra-articular injection.  With regards to the left knee we will move forward with a intra-articular injection however we will have to time this injection with PRP  In addition we will obtain an x-ray of the right hand in order to assess for MCP arthritis on the right.  In the meantime I instructed the patient to apply Voltaren gel to the affected areas he will hold off on systemic anti-inflammatories in setting of cardiac disease.   I have spent greater than 30 minutes preparing to see the patient, collecting relevant history, performing a thorough history and physical examination, counseling the patient regarding my findings ordering the appropriate therapies and tests, communicating with other relevant healthcare professionals, documenting my encounter and coordinating care.  Tang Armijo DO, FAAPMR Attending Physician, Interventional Pain Medicine  Department of Physical Medicine and Rehabilitation [facility] | [place] [address] [address] [place] [address] [address] [place] [address] Email: [email or web address]

## 2024-02-29 NOTE — HISTORY OF PRESENT ILLNESS
[FreeTextEntry1] : Patient presents for follow-up near full resolution of his neck issues not complaining of left hip pain which he describes as bursitis.  Has previously had an injection for this issue in November had about 3 months of relief now with return of symptoms review of documentation from Dr. Salgado reveals that it is his preference to hold off on any further steroid injections and to pursue other options such as PRP.  Also complaining of knee pain.This has been ongoing for several years has seen Dr. Salgado for this issue as well has not had any steroid injections thus far. [Neck] : neck [Other: ___] : [unfilled] [1] : an average pain level of 1/10

## 2024-02-29 NOTE — DATA REVIEWED
Subjective:      Nephrologist: Dr. Brooklyn Weinstein     Patient ID: Edwin Raman is a 77 y.o. male.    Erythrocytosis/Polycythemia--Since 2020    Work-up:  5/19/22--Epo level 18.2, JAK2 V617F mutation negative, JAK2 Exons 12-15 negative.      Chief Complaint: Other Misc (Pt reports no new concerns today.)    HPI     76 yo wm with h/o CKD Stage 3 secondary to nephrosclerosis found on routine labs by nephrologist to have mild erythrocytosis. Patient has a history of COPD on home O2 and also reports having a sleep study and O2 monitoring at night and his O2 was low at night. But he has not been diagnosed with sleep apnea and does not wear a CPAP. He is a former heavy smoker but quit several years ago.     Patient presents for follow-up of polycythemia. Work-up showed JAK2 negative and Epo level normal. Discussed results and likely polycythemia being reactive to COPD and ANSHUL. They are happy to hear the news.     Past Medical History:   Diagnosis Date    Arthritis     Bladder cancer     Kidney cancer, primary, with metastasis from kidney to other site 2018      Review of patient's allergies indicates:   Allergen Reactions    Ciprofloxacin Other (See Comments)      Current Outpatient Medications on File Prior to Visit   Medication Sig Dispense Refill    albuterol (PROVENTIL/VENTOLIN HFA) 90 mcg/actuation inhaler Inhale 2 puffs into the lungs once daily at 6am.      ascorbic acid, vitamin C, (VITAMIN C) 1000 MG tablet Take 1,000 mg by mouth once daily.      aspirin (ECOTRIN) 81 MG EC tablet Take 81 mg by mouth once daily.      atorvastatin (LIPITOR) 20 MG tablet Take 20 mg by mouth once daily.      cetirizine (ZYRTEC) 10 MG tablet Take 10 mg by mouth once daily.      famotidine (PEPCID) 40 MG tablet Take 40 mg by mouth as needed for Heartburn.      finasteride (PROSCAR) 5 mg tablet Take 5 mg by mouth once daily.      fluticasone propionate (FLONASE) 50 mcg/actuation nasal spray 1 spray by Each Nostril route  [Plain X-Rays] : plain X-Rays once daily.      multivitamin with minerals tablet Take 1 tablet by mouth once daily.      omega-3 fatty acids/fish oil (FISH OIL-OMEGA-3 FATTY ACIDS) 300-1,000 mg capsule Take 2 capsules by mouth once daily.      STIOLTO RESPIMAT 2.5-2.5 mcg/actuation Mist Inhale 2 puffs into the lungs once daily at 6am.      tamsulosin (FLOMAX) 0.4 mg Cap Take 1 capsule by mouth once daily.      zinc gluconate 50 mg tablet Take 50 mg by mouth once daily.      b complex vitamins capsule Take 1 capsule by mouth once daily.       No current facility-administered medications on file prior to visit.      Review of Systems   Constitutional: Negative for appetite change, fatigue, fever and unexpected weight change.   HENT: Negative for mouth sores.    Eyes: Negative.  Negative for visual disturbance.   Respiratory: Positive for shortness of breath. Negative for cough.         COPD on home O2   Cardiovascular: Negative for chest pain and leg swelling.   Gastrointestinal: Negative for abdominal distention, abdominal pain, constipation, diarrhea, nausea, vomiting and reflux.   Genitourinary: Negative for difficulty urinating, dysuria, frequency and hematuria.   Musculoskeletal: Negative for arthralgias and back pain.   Integumentary:  Negative for rash.   Neurological: Negative for weakness and headaches.   Hematological: Negative for adenopathy.   Psychiatric/Behavioral: Negative for sleep disturbance. The patient is not nervous/anxious.               Physical Exam  Constitutional:       General: He is awake.      Appearance: Normal appearance.   HENT:      Head: Normocephalic and atraumatic.      Nose: Nose normal.      Mouth/Throat:      Mouth: Mucous membranes are moist.   Eyes:      General: Vision grossly intact.      Extraocular Movements: Extraocular movements intact.      Conjunctiva/sclera: Conjunctivae normal.   Cardiovascular:      Rate and Rhythm: Normal rate and regular rhythm.      Heart sounds: Normal heart sounds.    Pulmonary:      Effort: Pulmonary effort is normal.      Breath sounds: Normal breath sounds.   Chest:   Breasts:      Right: No supraclavicular adenopathy.      Left: No supraclavicular adenopathy.       Abdominal:      General: Bowel sounds are normal. There is no distension.      Palpations: Abdomen is soft.      Tenderness: There is no abdominal tenderness.   Musculoskeletal:      Cervical back: Normal range of motion and neck supple.      Right lower leg: No edema.      Left lower leg: No edema.   Lymphadenopathy:      Cervical: No cervical adenopathy.      Upper Body:      Right upper body: No supraclavicular adenopathy.      Left upper body: No supraclavicular adenopathy.   Skin:     General: Skin is warm.   Neurological:      Mental Status: He is alert and oriented to person, place, and time.      Motor: Motor function is intact.   Psychiatric:         Mood and Affect: Mood normal.         Speech: Speech normal.         Behavior: Behavior is cooperative.         Judgment: Judgment normal.         No visits with results within 2 Week(s) from this visit.   Latest known visit with results is:   Office Visit on 05/19/2022   Component Date Value    Erythropoietin (EPO), S 05/19/2022 18.2     JAK2 Result 05/19/2022 see interpretation     JAK2 V617F Mutation Dete* 05/19/2022 SEE COMMENTS     WBC 05/19/2022 11.1     RBC 05/19/2022 4.71     Hgb 05/19/2022 14.7     Hct 05/19/2022 44.7     MCV 05/19/2022 94.9 (A)    MCH 05/19/2022 31.2 (A)    MCHC 05/19/2022 32.9 (A)    RDW 05/19/2022 13.0     Platelet 05/19/2022 223     MPV 05/19/2022 8.9 (A)    Neut % 05/19/2022 55.4     Lymph % 05/19/2022 32.6     Mono % 05/19/2022 7.7     Eos % 05/19/2022 3.6     Basophil % 05/19/2022 0.4     Lymph # 05/19/2022 3.61     Neut # 05/19/2022 6.1     Mono # 05/19/2022 0.85     Eos # 05/19/2022 0.40     Baso # 05/19/2022 0.04     IG# 05/19/2022 0.03 (A)    IG% 05/19/2022 0.3             Assessment:       1.  [FreeTextEntry1] :    INTERPRETATION:  4 views of the bilateral knees. AP pelvis and 3 views of each hip.  CLINICAL INDICATION: Bilateral knee and hip pain.  IMPRESSION: The patient is status post right total hip arthroplasty. The components are well-positioned. There is moderate left hip osteoarthrosis with joint space narrowing and sclerosis. Partially imaged lower lumbar degenerative disease is present.  There is severe left and moderate to severe right patellofemoral arthrosis. Minimal medial compartment arthrosis is noted bilaterally. Small joint effusions are noted bilaterally. There is vascular calcification.  Polycythemia         Plan:       Patient with polycythemia despite having CKD which usually causes anemia.  Patient also with a h/o COPD on home O2 PRN.  JAK2 negative and Epo level normal.    Suspect this is likely secondary polycythemia.  No further work-up needed.    RTC PRN.     All questions answered at this time.          Theresa Guardado MD

## 2024-02-29 NOTE — PHYSICAL EXAM
[FreeTextEntry1] : Gen: A+O x 3 in NAD Psych: Normal mood and affect. Responds appropriately to commands  Eyes: Anicteric. No discharge. EOMI. Resp: Breathing unlabored  CV: Well Perfused Ext: No c/c/e  Skin: No lesions noted    Gait: Non antalgic, normal reciprocating heel to toe, able to stand on toes and heels.  Tandem gait intact  Negative romberg    Stance: No Trendelenburg sign present with single leg stance     Neuro:   Tone: Normal. No clonus.  Sensation: Grossly intact to light touch and pinprick bilateral lower limbs. Proprioception: Intact at big toes bilaterally.  Reflexes: 2+  symmetric knee jerk, medial hamstring, ankle jerk. Plantars downgoing bilaterally.    MMT:  5/5 in b/l lower extremities      Spine:   Inspection: Alignment is midline, with no evidence of scoliosis. Iliac crest heights and PSIS heights level. No rib hump noted upon forward flexion of the trunk.     Palpation: There is + tenderness over the greater trochanters on the left midline spinous processes, paravertebral muscles, PSIS,   Neg TTP over sciatic notch,.      Lumbar ROM: Flexion, extension, side-bending, rotation, limited in most planes  pain with lateral flexion  pain with oblique extension  pain with lateral rotation      Hip ROM:  -Pain at terminal ROM bilaterally.  -FAIR, FABERE negative bilaterally.             -SLR negative bilaterally    -Crossed SLR negative bilaterally.     -Slump test negative bilaterally.     -Femoral Nerve Stretch test negative bilaterally    -Crossed Femoral Nerve test negative bilaterally         -Facet loading (Kemps Test) did not produce low back pain.    -Midline Sacral Thrust did not produce pain.

## 2024-04-05 ENCOUNTER — APPOINTMENT (OUTPATIENT)
Dept: PHYSICAL MEDICINE AND REHAB | Facility: CLINIC | Age: 69
End: 2024-04-05
Payer: MEDICARE

## 2024-04-05 VITALS
OXYGEN SATURATION: 94 % | HEIGHT: 62 IN | BODY MASS INDEX: 25.4 KG/M2 | DIASTOLIC BLOOD PRESSURE: 88 MMHG | SYSTOLIC BLOOD PRESSURE: 154 MMHG | WEIGHT: 138 LBS | HEART RATE: 74 BPM

## 2024-04-05 PROCEDURE — 20611 DRAIN/INJ JOINT/BURSA W/US: CPT

## 2024-04-05 PROCEDURE — 0232T NJX PLATELET PLASMA: CPT

## 2024-04-05 NOTE — PROCEDURE
[de-identified] : PROCEDURE:          PRP injection to the Greater Trochanteric Injection with Ultrasound Guidance       Patient:  HUANG GREGORY   MRN #: [id number]   : Sep 26 1955          Date: 2024                 Physician: Tang Armijo DO         Clinical Scenario and Exam: As per our office notes.            Diagnosis:  Greater trochanteric pain syndrome            Injectate:  2 cc of platelet rich plasma, 5 cc of platelet poor plasma extracted from 50 cc of venous blood via Arthrex centrifuge         Levels Injected:  LEFT GTB            Approach: in-plane dorsal to ventral           Procedure  The LEFT  GTB was examined under ultrasound using a linear array transducer.  There was heterogeneity of the glut med and glut min consistent with GTB pain syndrome.     Procedure:  Risks, benefits and alternatives were discussed, all questions were answered, and the patient signed informed consent. Risks include but are not limited to bleeding, infection, worsening pain, nerve damage, scar formation.       The skin was then cleaned with alcohol. A ppd was made with a 25 G 1.5 inch needle 2ml 1% lidocaine.  A 22G 3.5 inch spinal needle was then inserted in-plane.  The needle was well visualized and guided to the bursal soft tissue planes. After negative aspiration, the injectate was administered within planes of the bursa and adjacent tendons. The patient tolerated the procedure well without complication.              Patient had improved ROM and pain relief after injection without any weakness or numbness.    If there are any complications, the patient was instructed to call us.  The patient is to follow-up with us in one to two weeks.

## 2024-04-29 ENCOUNTER — APPOINTMENT (OUTPATIENT)
Dept: ORTHOPEDIC SURGERY | Facility: CLINIC | Age: 69
End: 2024-04-29
Payer: MEDICARE

## 2024-04-29 VITALS
HEART RATE: 71 BPM | OXYGEN SATURATION: 95 % | WEIGHT: 138 LBS | BODY MASS INDEX: 25.4 KG/M2 | DIASTOLIC BLOOD PRESSURE: 86 MMHG | SYSTOLIC BLOOD PRESSURE: 152 MMHG | HEIGHT: 62 IN | TEMPERATURE: 97.6 F

## 2024-04-29 DIAGNOSIS — M17.0 BILATERAL PRIMARY OSTEOARTHRITIS OF KNEE: ICD-10-CM

## 2024-04-29 PROCEDURE — 20610 DRAIN/INJ JOINT/BURSA W/O US: CPT | Mod: 50

## 2024-04-29 PROCEDURE — 99213 OFFICE O/P EST LOW 20 MIN: CPT | Mod: 25

## 2024-05-01 PROBLEM — M17.0 PRIMARY OSTEOARTHRITIS OF BOTH KNEES: Status: ACTIVE | Noted: 2019-11-18

## 2024-05-01 NOTE — DISCUSSION/SUMMARY
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 He recevied Monovisc injections in both knees today. He will increase his activities as tolerated. we will see him back on an as needed basis. He will call if any issues arise

## 2024-05-01 NOTE — HISTORY OF PRESENT ILLNESS
[de-identified] : Andrew returns today for evaluation of both knees. He has longstanding issues with PF DJD in both knees. He has managed well over the years with Monovisc injections every six months to a year. His last injections were in July and he just started to note some increased stiffness in both knees. He has been bowling without much discomfort. He has had ongoing issues with his left hip and just had a PRP injection. He has been taking Tylenol for discomfort in his knees and his hip. he denies any instability or locking in his knees.

## 2024-05-01 NOTE — PHYSICAL EXAM
[de-identified] : The patient is a well developed, well nourished male in no apparent distress. He is alert and oriented X 3 with a pleasant mood and appropriate affect.   On physical examination of the right knee, his ROM is 0-120 degrees. The patient walks with a normal gait and stands in neutral alignment. There is no effusion. No warmth or erythema is noted. The patella is tender to palpation medially and laterally. There is patellofemoral crepitus noted. The apprehension and grind tests are negative. The extensor mechanism is intact. There is no joint line tenderness. The Chauncey sign is absent. The Lachman and pivot shift tests are negative. There is no varus or valgus laxity at 0 or 30 degrees. No posterolateral or anteromedial laxity is noted. No masses are palpable. No other soft tissue or bony tenderness is noted. Quadriceps weakness is noted. Neurovascular function is intact.    On physical examination of the left knee,his ROM is 0-115 degrees range of motion. The patient walks with a normal gait and stands in neutral alignment. There is trace effusion. No warmth or erythema is noted. The patella is tender to palpation medially and laterally. There is patellofemoral crepitus noted. The apprehension and grind tests are negative. The extensor mechanism is intact. There is no joint line tenderness. The Chauncey sign is absent. The Lachman and pivot shift tests are negative. There is no varus or valgus laxity at 0 or 30 degrees. No posterolateral or anteromedial laxity is noted. No masses are palpable. No other soft tissue or bony tenderness is noted. There is some tenderness noted on palpation of the IT band. Quadriceps weakness is noted. Neurovascular function is intact.

## 2024-05-01 NOTE — PROCEDURE
[de-identified] :  Under strict sterile technique, both knees were  prepped with Chloraprep. Using the superolateral approach, with the patient supine, a 4mL injection of Monovisc was administered intra-articularly into each knee. The patient tolerated the procedure well. The patient was instructed to avoid vigorous exercise for 48 hours and will apply ice to the knee for 20 minutes 2-3 times per day if discomfort occurs. Patient will return on an as needed basis. The patient will call if any questions or problems should arise.  MonoVisc injection - Bilateral knee joints Lot #: [id number] Exp: 04- Man: [employer] NDC: [address]-98

## 2024-06-27 ENCOUNTER — APPOINTMENT (OUTPATIENT)
Dept: PHYSICAL MEDICINE AND REHAB | Facility: CLINIC | Age: 69
End: 2024-06-27
Payer: MEDICARE

## 2024-06-27 VITALS
HEIGHT: 62 IN | RESPIRATION RATE: 16 BRPM | HEART RATE: 74 BPM | DIASTOLIC BLOOD PRESSURE: 87 MMHG | BODY MASS INDEX: 25.4 KG/M2 | WEIGHT: 138 LBS | SYSTOLIC BLOOD PRESSURE: 154 MMHG | OXYGEN SATURATION: 95 %

## 2024-06-27 DIAGNOSIS — M17.12 UNILATERAL PRIMARY OSTEOARTHRITIS, LEFT KNEE: ICD-10-CM

## 2024-06-27 PROCEDURE — 99214 OFFICE O/P EST MOD 30 MIN: CPT

## 2024-07-04 DIAGNOSIS — G89.29 PAIN IN LEFT KNEE: ICD-10-CM

## 2024-07-04 DIAGNOSIS — M17.12 UNILATERAL PRIMARY OSTEOARTHRITIS, LEFT KNEE: ICD-10-CM

## 2024-07-04 DIAGNOSIS — M25.562 PAIN IN LEFT KNEE: ICD-10-CM

## 2024-07-08 ENCOUNTER — OUTPATIENT (OUTPATIENT)
Dept: OUTPATIENT SERVICES | Facility: HOSPITAL | Age: 69
LOS: 1 days | End: 2024-07-08
Payer: MEDICARE

## 2024-07-08 ENCOUNTER — APPOINTMENT (OUTPATIENT)
Dept: ORTHOPEDIC SURGERY | Facility: CLINIC | Age: 69
End: 2024-07-08
Payer: MEDICARE

## 2024-07-08 VITALS
BODY MASS INDEX: 25.76 KG/M2 | DIASTOLIC BLOOD PRESSURE: 86 MMHG | HEIGHT: 62 IN | HEART RATE: 70 BPM | SYSTOLIC BLOOD PRESSURE: 150 MMHG | OXYGEN SATURATION: 96 % | WEIGHT: 140 LBS

## 2024-07-08 DIAGNOSIS — M25.561 PAIN IN RIGHT KNEE: ICD-10-CM

## 2024-07-08 DIAGNOSIS — Q30.9 CONGENITAL MALFORMATION OF NOSE, UNSPECIFIED: Chronic | ICD-10-CM

## 2024-07-08 DIAGNOSIS — Z87.448 PERSONAL HISTORY OF OTHER DISEASES OF URINARY SYSTEM: ICD-10-CM

## 2024-07-08 DIAGNOSIS — Z98.89 UNDEFINED: Chronic | ICD-10-CM

## 2024-07-08 DIAGNOSIS — M25.562 PAIN IN RIGHT KNEE: ICD-10-CM

## 2024-07-08 DIAGNOSIS — Z95.5 PRESENCE OF CORONARY ANGIOPLASTY IMPLANT AND GRAFT: Chronic | ICD-10-CM

## 2024-07-08 DIAGNOSIS — Z98.890 OTHER SPECIFIED POSTPROCEDURAL STATES: Chronic | ICD-10-CM

## 2024-07-08 DIAGNOSIS — K21.9 GASTRO-ESOPHAGEAL REFLUX DISEASE W/OUT ESOPHAGITIS: ICD-10-CM

## 2024-07-08 DIAGNOSIS — Z87.19 PERSONAL HISTORY OF OTHER DISEASES OF THE DIGESTIVE SYSTEM: ICD-10-CM

## 2024-07-08 DIAGNOSIS — Z86.39 PERSONAL HISTORY OF OTHER ENDOCRINE, NUTRITIONAL AND METABOLIC DISEASE: ICD-10-CM

## 2024-07-08 DIAGNOSIS — N20.0 CALCULUS OF KIDNEY: ICD-10-CM

## 2024-07-08 DIAGNOSIS — I25.2 OLD MYOCARDIAL INFARCTION: ICD-10-CM

## 2024-07-08 DIAGNOSIS — G89.29 PAIN IN RIGHT KNEE: ICD-10-CM

## 2024-07-08 DIAGNOSIS — Z86.79 PERSONAL HISTORY OF OTHER DISEASES OF THE CIRCULATORY SYSTEM: ICD-10-CM

## 2024-07-08 DIAGNOSIS — M16.12 UNILATERAL PRIMARY OSTEOARTHRITIS, LEFT HIP: ICD-10-CM

## 2024-07-08 DIAGNOSIS — Z95.5 PRESENCE OF CORONARY ANGIOPLASTY IMPLANT AND GRAFT: ICD-10-CM

## 2024-07-08 DIAGNOSIS — M17.0 BILATERAL PRIMARY OSTEOARTHRITIS OF KNEE: ICD-10-CM

## 2024-07-08 PROCEDURE — 73564 X-RAY EXAM KNEE 4 OR MORE: CPT | Mod: 26,50,59

## 2024-07-08 PROCEDURE — 77073 BONE LENGTH STUDIES: CPT | Mod: 26

## 2024-07-08 PROCEDURE — 99215 OFFICE O/P EST HI 40 MIN: CPT

## 2024-07-08 PROCEDURE — 77073 BONE LENGTH STUDIES: CPT

## 2024-07-08 PROCEDURE — 73564 X-RAY EXAM KNEE 4 OR MORE: CPT

## 2024-09-04 ENCOUNTER — APPOINTMENT (OUTPATIENT)
Dept: PHYSICAL MEDICINE AND REHAB | Facility: CLINIC | Age: 69
End: 2024-09-04
Payer: MEDICARE

## 2024-09-04 VITALS
SYSTOLIC BLOOD PRESSURE: 160 MMHG | OXYGEN SATURATION: 95 % | HEIGHT: 62 IN | BODY MASS INDEX: 25.76 KG/M2 | WEIGHT: 140 LBS | DIASTOLIC BLOOD PRESSURE: 93 MMHG | HEART RATE: 74 BPM

## 2024-09-04 DIAGNOSIS — M17.12 UNILATERAL PRIMARY OSTEOARTHRITIS, LEFT KNEE: ICD-10-CM

## 2024-09-04 DIAGNOSIS — M70.62 TROCHANTERIC BURSITIS, LEFT HIP: ICD-10-CM

## 2024-09-04 DIAGNOSIS — M17.0 BILATERAL PRIMARY OSTEOARTHRITIS OF KNEE: ICD-10-CM

## 2024-09-04 DIAGNOSIS — M16.12 UNILATERAL PRIMARY OSTEOARTHRITIS, LEFT HIP: ICD-10-CM

## 2024-09-04 DIAGNOSIS — M48.02 SPINAL STENOSIS, CERVICAL REGION: ICD-10-CM

## 2024-09-04 PROCEDURE — 99213 OFFICE O/P EST LOW 20 MIN: CPT

## 2024-09-05 NOTE — HISTORY OF PRESENT ILLNESS
[Other: ___] : [unfilled] [2] : a current pain level of 2/10 [0] : a minimum pain level of 0/10 [4] : a maximum pain level of 4/10 [FreeTextEntry1] : 09/04/2024 Patient is here for follow up after undergoing a Plasma rich Platelet Injection on 06/27/24. The patient reports approximately 75% improvement following the procedure. The patient reports no new complaints today.

## 2024-09-05 NOTE — ASSESSMENT
[FreeTextEntry1] : Patient is overall improving in terms of his GTB syndrome on the left side after PRP.  He has ongoing knee arthritis which is well-managed with hyaluronic acid.  I reviewed the documentation from the orthopedic team and I am in agreement that he will eventually need a knee replacement.  I advised him on the process of knee replacements the recovery time in the rehab.  In addition I am recommending turmeric supplementation  Patient to follow-up in 1 year or sooner if needed   Tang Armijo DO, FAAPMR Attending Physician, Interventional Pain Medicine  Department of Physical Medicine and Rehabilitation [facility] | [facility] [address] [place] [address]. [address] [place] [address] Email: [email or web address]  I have spent greater than 20 minutes preparing to see the patient, collecting relevant history, performing a thorough history and physical examination, counseling the patient regarding my findings ordering the appropriate therapies and tests, communicating with other relevant healthcare professionals, documenting my encounter and coordinating care.

## 2024-09-05 NOTE — ASSESSMENT
[FreeTextEntry1] : Patient is overall improving in terms of his GTB syndrome on the left side after PRP.  He has ongoing knee arthritis which is well-managed with hyaluronic acid.  I reviewed the documentation from the orthopedic team and I am in agreement that he will eventually need a knee replacement.  I advised him on the process of knee replacements the recovery time in the rehab.  In addition I am recommending turmeric supplementation  Patient to follow-up in 1 year or sooner if needed   Tang Armijo DO, FAAPMR Attending Physician, Interventional Pain Medicine  Department of Physical Medicine and Rehabilitation [facility] | [facility] [address] [place] [address]. [address] [place] [address] Email: [email or web address]  I have spent greater than 20 minutes preparing to see the patient, collecting relevant history, performing a thorough history and physical examination, counseling the patient regarding my findings ordering the appropriate therapies and tests, communicating with other relevant healthcare professionals, documenting my encounter and coordinating care.

## 2024-10-02 ENCOUNTER — APPOINTMENT (OUTPATIENT)
Dept: ORTHOPEDIC SURGERY | Facility: CLINIC | Age: 69
End: 2024-10-02
Payer: MEDICARE

## 2024-10-02 ENCOUNTER — RESULT REVIEW (OUTPATIENT)
Age: 69
End: 2024-10-02

## 2024-10-02 VITALS
DIASTOLIC BLOOD PRESSURE: 86 MMHG | HEIGHT: 62 IN | BODY MASS INDEX: 25.76 KG/M2 | OXYGEN SATURATION: 94 % | SYSTOLIC BLOOD PRESSURE: 142 MMHG | HEART RATE: 78 BPM | WEIGHT: 140 LBS

## 2024-10-02 DIAGNOSIS — M16.12 UNILATERAL PRIMARY OSTEOARTHRITIS, LEFT HIP: ICD-10-CM

## 2024-10-02 DIAGNOSIS — Z47.1 AFTERCARE FOLLOWING JOINT REPLACEMENT SURGERY: ICD-10-CM

## 2024-10-02 DIAGNOSIS — Z96.641 AFTERCARE FOLLOWING JOINT REPLACEMENT SURGERY: ICD-10-CM

## 2024-10-02 PROCEDURE — 99215 OFFICE O/P EST HI 40 MIN: CPT

## 2024-10-03 NOTE — PHYSICAL EXAM
[de-identified] : General appearance: well nourished and hydrated, pleasant, alert and oriented x 3, cooperative.   HEENT: normocephalic, EOM intact, wearing mask, external auditory canal clear.   Cardiovascular: no lower leg edema, no varicosities, dorsalis pedis pulses palpable and symmetric.   Lymphatics: no palpable lymphadenopathy, no lymphedema.   Neurologic: sensation is normal, no muscle weakness in upper or lower extremities, patella tendon reflexes present and symmetric.   Dermatologic: skin moist, warm, no rash.   Spine: cervical spine with normal lordosis and painless range of motion, thoracic spine with normal kyphosis and painless range of motion, lumbosacral spine with normal lordosis and painless range of motion.  No tenderness to palpation along midline spine and paraspinal musculature.  Sacroiliac joints nontender bilaterally. Negative SLR and crossed SLR tests bilaterally. Gait: presents without the use of an assistive device. cautious to stand and get started. demonstrates a L-sided gait pattern  Limb lengths: clinically L lower extremities approximately 2mm shorter than R  Left hip: - Focal soft tissue swelling: none - Ecchymosis: none - Erythema: none - Wounds: none - Tenderness: both anterior and lateral tenderness to palpation. very mild tenderness to palpation to the superior half of the iliotibial band - ROM:    - Flexion: 100   - Extension: 0   - Adduction: 5   - Abduction: 20   - Internal rotation in 90 degrees of hip flexion: 0   - External rotation in 90 degrees of hip flexion: 20 - CELIA: posititve - FADIR: mildly positive - Belle: negative - Stinchfield: negative - Flexor power: 4+/5 - Abductor power: 5/5  Right hip:  - Focal soft tissue swelling: none - Ecchymosis: none - Erythema: none - Wounds: well healed anterior incision, benign appearing - Tenderness: none - ROM:    - Flexion: 115   - Extension: 0   - Adduction: 15   - Abduction: 40   - Internal rotation in 90 degrees of hip flexion: 30   - External rotation in 90 degrees of hip flexion: 30 - CELIA: negative - FADIR: negative - Belle: negative - Stinchfield: negative - Flexor power: 4+/5 - Abductor power: 4+/5  [de-identified] : Bilateral hip XRs were interpreted by me and reviewed with the patient.  Location of imaging: [facility] Date of exam: 10/2/2024  Right hip--  demonstrate stable appearance of R THR as compared to prior imaging without evidence of mechanical complication  Left knee --  Arthritis: moderate osteoarthritis with diffuse joint space narrowing, Tonnis 2-3  deformity: associated coxa vara deformity osteonecrosis: none

## 2024-10-03 NOTE — DISCUSSION/SUMMARY
[de-identified] : 69 year old male, now about 3 years S/P R THR, doing well; with active left hip osteoarthritis and trochanteric bursitis. - He is a candidate to consider a left total hip replacement.  - We discussed the details of the procedure, the expected recovery period, and the expected outcome. We discussed the likelihood of satisfaction after complete recovery, and the potential causes of dissatisfaction. The importance of active patient participation in the rehabilitation protocol was emphasized, along with its influence on short and long-term outcomes. We discussed the risks, benefits, and alternatives of surgery at length. Specific risks of total hip replacement were discussed in detail. We discussed the risk of surgical site complications including but not limited to: surgical site infection, wound healing complications, bone fracture, tendon or ligament injury, neurovascular injury, hemorrhage, postoperative stiffness or instability, persistent pain, limb length discrepancy, and need for reoperation. We discussed surgical blood loss and the possible need for blood transfusion. We discussed the risk of perioperative medical complications, including but not limited to catheter-associated urinary tract infection, venous thromboembolism and other cardiopulmonary complications. We discussed anesthetic options and the risk of anesthesia-related complications. We discussed the potential benefits of surgery including the potential to improve the current clinical condition through operative intervention. I emphasized that there are alternatives to surgical intervention including continued conservative management, though such a course could yield less than optimal results in this particular patient. A model was used to demonstrate the operation and to discuss bearing surfaces of the implants. We discussed implant fixation methods; my plan would be to use fully cementless fixation in this case. We discussed the various surgical approaches to the hip; I think that an anterior approach would be appropriate in this case. We discussed that it is relatively common following anterior approach hip arthroplasty to develop numbness of the lateral thigh secondary to injury to the branches of the lateral femoral cutaneous nerve, which in most cases does improve over the course of the first postoperative year, but which can also be permanent. We discussed the durability of prosthetic hips and limitations related to wear, osteolysis and loosening. All questions were answered to the patient's satisfaction. The patient was given a copy of my preoperative packet with additional information about the procedure. I asked the patient to either call back or schedule a followup appointment for any additional questions or concerns regarding the procedure. - We did discuss that hip replacement surgery does not necessarily resolve trochanteric bursitis, and that the bursitis may continue to require separate management post-operatively.  - After full discussion, the patient is most likely going to move forward with the hip replacement, but did request some further time to discuss with his wife and other family members. For the moment, we provided him with a new referral to physical therapy. I recommended that he use Tylenol and Topical Diclofenac as needed. I encourage him to call my surgical coordinator back if he does decide to pursue further surgery, in which case he could be booked for the L THR at a convenient time with routine medical clearance.  - We also discussed possibly repeating the L trochanteric bursa cortisone injection, which he declined for today.

## 2024-10-03 NOTE — HISTORY OF PRESENT ILLNESS
[de-identified] : R NUSRAT 12.16.2021  10/2/2024: 69-year-old male presenting for a follow-up evaluation of R NUSRAT and L hip osteoarthritis. The patient is now almost three years S/P R  hip doing well without any complaints. His primary pain concern today is the L hip. He localized the pain to the anterior groin, lateral hip with radiation down to the mid-lateral thigh. He recently underwent a course of treatment from Dr. Fenton, including a PRP injection to the L greater trochanter. He reports about 75% relief, which lasted for about two months. The PRP injection was overall somewhat less effective than prior trochanteric CSI. He said his pain has returned and is mainly, localized to the anterior groin. He is here to discuss further treatment today.  12/15/23 68M presenting for follow up evaluation of right total hip arthroplasty as well as left hip osteoarthritis, left trochanteric bursitis, and bilateral knee osteoarthritis. Pt was last seen in office on November 3rd and was administered a left hip trochanter bursa injection. Pt states he's got good relief from that injection today and continues to get goof relief. He denies any pain of his knees. States his hip occasionally causes him slight pain when he sits for an extended period of time. Overall pt is not complaining today of any primary pain from his hip or knees but states his most recent pain generator is his left arm. He recently went to the ER and was told that his shoulder pain is likely of nerve origin.   11/3/2023: 69 y/o M presents about 2 years status post right total hip arthroplasty. Right hip is doing well. He presents for followup of left hip osteoarthritis as well as bilateral knee osteoarthritis. He's continuing to follow up with Dr. Andrea for his bilateral knees (serial HA injections). He states today that his primary pain generator is his left hip. He's been having symptoms for the last six months or so, progressively getting worse, localized to the lateral hip and anterior groin. He notes difficulty primarily with changing position from sitting to standing. He's currently participating in PT as well as taking tylenol. The left hip symptoms are still not anywhere near so bad as his right hip pre-NUSRAT. He would like to discuss further treatment for his left hip today.   12/06/2022: 67 y/o male presenting now 1 year s/p R NUSRAT. He reports that overall the right hip is doing well. He does get some occasional sensation of pain and stiffness along the right thigh that he attributes to muscular issues. He also gets occasional twinges of pain on the left hip. He is under treatment by Dr. Andrea and has been for the last 12 years for severe b/l knee OA, receiving serial injections. He is continuing to ambulate without an assistive device, walking for exercise, and not using any analgesics or anti-inflammatories.   3/28/22: Post op visit # 3.  14 weeks, 4 days S/P total right hip arthroplasty. Mr Angelica comes in today for the third post op visit. He has no complains today. He has been doing physical therapy 2x/week and feels great improvement over the course of it. He denies any pain. Usually hits his target of 10k steps/day. Would like to continue with OPT.  12/7/21: Returning with wife for re-evaluation. No improvement in right hip symptoms with PT. Cardiologist forbade NSAIDs, more due to CKD than CAD. CKD workup is ongoing; no identifiable cause noted so far. They report generally poor function with daily steps now around 3k, sharp spikes of pain somewhat unpredictably, and overall unacceptable quality of life. Would like to book NUSRAT as soon as possible.  11/16/21: 65y/o male p/w R >> L hip pain progressive for about 3 months. No injury or other inciting event. Pain localizes to the groin with radiation down the medial thigh and around the lateral hip. Worst exacerbating activity is rising from seated position. Walking is not too bad after getting started; today he walked here from [facility] without too much difficulty. Started PT in September and hasn't found it particularly helpful. Takes no pain medications.   PMH sig for HTN, CAD s/p MI in 2010 managed with 1x stent, DM (last known A1c 5.9). 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

## 2024-10-03 NOTE — END OF VISIT
[FreeTextEntry3] :  Documented by Eva Levy acting as a scribe for Dr. Bob Salgado. 10/02/2024   All medical record entries made by the Scribe were at my, Dr. Bob Salgado, direction and personally dictated by me on 10/02/2024. I have reviewed the chart and agree that the record accurately reflects my personal performance of the history, physical exam, assessment and plan. I have also personally directed, reviewed, and agreed with the chart.

## 2024-10-03 NOTE — HISTORY OF PRESENT ILLNESS
[de-identified] : R NUSRAT 12.16.2021  10/2/2024: 69-year-old male presenting for a follow-up evaluation of R NUSRAT and L hip osteoarthritis. The patient is now almost three years S/P R  hip doing well without any complaints. His primary pain concern today is the L hip. He localized the pain to the anterior groin, lateral hip with radiation down to the mid-lateral thigh. He recently underwent a course of treatment from Dr. Fenton, including a PRP injection to the L greater trochanter. He reports about 75% relief, which lasted for about two months. The PRP injection was overall somewhat less effective than prior trochanteric CSI. He said his pain has returned and is mainly, localized to the anterior groin. He is here to discuss further treatment today.  12/15/23 68M presenting for follow up evaluation of right total hip arthroplasty as well as left hip osteoarthritis, left trochanteric bursitis, and bilateral knee osteoarthritis. Pt was last seen in office on November 3rd and was administered a left hip trochanter bursa injection. Pt states he's got good relief from that injection today and continues to get goof relief. He denies any pain of his knees. States his hip occasionally causes him slight pain when he sits for an extended period of time. Overall pt is not complaining today of any primary pain from his hip or knees but states his most recent pain generator is his left arm. He recently went to the ER and was told that his shoulder pain is likely of nerve origin.   11/3/2023: 69 y/o M presents about 2 years status post right total hip arthroplasty. Right hip is doing well. He presents for followup of left hip osteoarthritis as well as bilateral knee osteoarthritis. He's continuing to follow up with Dr. Andrea for his bilateral knees (serial HA injections). He states today that his primary pain generator is his left hip. He's been having symptoms for the last six months or so, progressively getting worse, localized to the lateral hip and anterior groin. He notes difficulty primarily with changing position from sitting to standing. He's currently participating in PT as well as taking tylenol. The left hip symptoms are still not anywhere near so bad as his right hip pre-NUSRAT. He would like to discuss further treatment for his left hip today.   12/06/2022: 65 y/o male presenting now 1 year s/p R NUSRAT. He reports that overall the right hip is doing well. He does get some occasional sensation of pain and stiffness along the right thigh that he attributes to muscular issues. He also gets occasional twinges of pain on the left hip. He is under treatment by Dr. Andrea and has been for the last 12 years for severe b/l knee OA, receiving serial injections. He is continuing to ambulate without an assistive device, walking for exercise, and not using any analgesics or anti-inflammatories.   3/28/22: Post op visit # 3.  14 weeks, 4 days S/P total right hip arthroplasty. Mr Angelica comes in today for the third post op visit. He has no complains today. He has been doing physical therapy 2x/week and feels great improvement over the course of it. He denies any pain. Usually hits his target of 10k steps/day. Would like to continue with OPT.  12/7/21: Returning with wife for re-evaluation. No improvement in right hip symptoms with PT. Cardiologist forbade NSAIDs, more due to CKD than CAD. CKD workup is ongoing; no identifiable cause noted so far. They report generally poor function with daily steps now around 3k, sharp spikes of pain somewhat unpredictably, and overall unacceptable quality of life. Would like to book NUSRAT as soon as possible.  11/16/21: 65y/o male p/w R >> L hip pain progressive for about 3 months. No injury or other inciting event. Pain localizes to the groin with radiation down the medial thigh and around the lateral hip. Worst exacerbating activity is rising from seated position. Walking is not too bad after getting started; today he walked here from [facility] without too much difficulty. Started PT in September and hasn't found it particularly helpful. Takes no pain medications.   PMH sig for HTN, CAD s/p MI in 2010 managed with 1x stent, DM (last known A1c 5.9). 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

## 2024-10-03 NOTE — PHYSICAL EXAM
[de-identified] : General appearance: well nourished and hydrated, pleasant, alert and oriented x 3, cooperative.   HEENT: normocephalic, EOM intact, wearing mask, external auditory canal clear.   Cardiovascular: no lower leg edema, no varicosities, dorsalis pedis pulses palpable and symmetric.   Lymphatics: no palpable lymphadenopathy, no lymphedema.   Neurologic: sensation is normal, no muscle weakness in upper or lower extremities, patella tendon reflexes present and symmetric.   Dermatologic: skin moist, warm, no rash.   Spine: cervical spine with normal lordosis and painless range of motion, thoracic spine with normal kyphosis and painless range of motion, lumbosacral spine with normal lordosis and painless range of motion.  No tenderness to palpation along midline spine and paraspinal musculature.  Sacroiliac joints nontender bilaterally. Negative SLR and crossed SLR tests bilaterally. Gait: presents without the use of an assistive device. cautious to stand and get started. demonstrates a L-sided gait pattern  Limb lengths: clinically L lower extremities approximately 2mm shorter than R  Left hip: - Focal soft tissue swelling: none - Ecchymosis: none - Erythema: none - Wounds: none - Tenderness: both anterior and lateral tenderness to palpation. very mild tenderness to palpation to the superior half of the iliotibial band - ROM:    - Flexion: 100   - Extension: 0   - Adduction: 5   - Abduction: 20   - Internal rotation in 90 degrees of hip flexion: 0   - External rotation in 90 degrees of hip flexion: 20 - CELIA: posititve - FADIR: mildly positive - Belle: negative - Stinchfield: negative - Flexor power: 4+/5 - Abductor power: 5/5  Right hip:  - Focal soft tissue swelling: none - Ecchymosis: none - Erythema: none - Wounds: well healed anterior incision, benign appearing - Tenderness: none - ROM:    - Flexion: 115   - Extension: 0   - Adduction: 15   - Abduction: 40   - Internal rotation in 90 degrees of hip flexion: 30   - External rotation in 90 degrees of hip flexion: 30 - CELIA: negative - FADIR: negative - Belle: negative - Stinchfield: negative - Flexor power: 4+/5 - Abductor power: 4+/5  [de-identified] : Bilateral hip XRs were interpreted by me and reviewed with the patient.  Location of imaging: [facility] Date of exam: 10/2/2024  Right hip--  demonstrate stable appearance of R THR as compared to prior imaging without evidence of mechanical complication  Left knee --  Arthritis: moderate osteoarthritis with diffuse joint space narrowing, [place] 2-3  deformity: associated coxa vara deformity osteonecrosis: none

## 2024-10-03 NOTE — DISCUSSION/SUMMARY
[de-identified] : 69 year old male, now about 3 years S/P R THR, doing well; with active left hip osteoarthritis and trochanteric bursitis. - He is a candidate to consider a left total hip replacement.  - We discussed the details of the procedure, the expected recovery period, and the expected outcome. We discussed the likelihood of satisfaction after complete recovery, and the potential causes of dissatisfaction. The importance of active patient participation in the rehabilitation protocol was emphasized, along with its influence on short and long-term outcomes. We discussed the risks, benefits, and alternatives of surgery at length. Specific risks of total hip replacement were discussed in detail. We discussed the risk of surgical site complications including but not limited to: surgical site infection, wound healing complications, bone fracture, tendon or ligament injury, neurovascular injury, hemorrhage, postoperative stiffness or instability, persistent pain, limb length discrepancy, and need for reoperation. We discussed surgical blood loss and the possible need for blood transfusion. We discussed the risk of perioperative medical complications, including but not limited to catheter-associated urinary tract infection, venous thromboembolism and other cardiopulmonary complications. We discussed anesthetic options and the risk of anesthesia-related complications. We discussed the potential benefits of surgery including the potential to improve the current clinical condition through operative intervention. I emphasized that there are alternatives to surgical intervention including continued conservative management, though such a course could yield less than optimal results in this particular patient. A model was used to demonstrate the operation and to discuss bearing surfaces of the implants. We discussed implant fixation methods; my plan would be to use fully cementless fixation in this case. We discussed the various surgical approaches to the hip; I think that an anterior approach would be appropriate in this case. We discussed that it is relatively common following anterior approach hip arthroplasty to develop numbness of the lateral thigh secondary to injury to the branches of the lateral femoral cutaneous nerve, which in most cases does improve over the course of the first postoperative year, but which can also be permanent. We discussed the durability of prosthetic hips and limitations related to wear, osteolysis and loosening. All questions were answered to the patient's satisfaction. The patient was given a copy of my preoperative packet with additional information about the procedure. I asked the patient to either call back or schedule a followup appointment for any additional questions or concerns regarding the procedure. - We did discuss that hip replacement surgery does not necessarily resolve trochanteric bursitis, and that the bursitis may continue to require separate management post-operatively.  - After full discussion, the patient is most likely going to move forward with the hip replacement, but did request some further time to discuss with his wife and other family members. For the moment, we provided him with a new referral to physical therapy. I recommended that he use Tylenol and Topical Diclofenac as needed. I encourage him to call my surgical coordinator back if he does decide to pursue further surgery, in which case he could be booked for the L THR at a convenient time with routine medical clearance.  - We also discussed possibly repeating the L trochanteric bursa cortisone injection, which he declined for today.

## 2024-12-20 ENCOUNTER — APPOINTMENT (OUTPATIENT)
Dept: ORTHOPEDIC SURGERY | Facility: CLINIC | Age: 69
End: 2024-12-20
Payer: MEDICARE

## 2024-12-20 VITALS
SYSTOLIC BLOOD PRESSURE: 159 MMHG | HEART RATE: 84 BPM | WEIGHT: 140 LBS | HEIGHT: 62 IN | BODY MASS INDEX: 25.76 KG/M2 | DIASTOLIC BLOOD PRESSURE: 94 MMHG | OXYGEN SATURATION: 95 %

## 2024-12-20 DIAGNOSIS — M16.12 UNILATERAL PRIMARY OSTEOARTHRITIS, LEFT HIP: ICD-10-CM

## 2024-12-20 DIAGNOSIS — M70.62 TROCHANTERIC BURSITIS, LEFT HIP: ICD-10-CM

## 2024-12-20 PROCEDURE — 20610 DRAIN/INJ JOINT/BURSA W/O US: CPT | Mod: LT

## 2024-12-20 PROCEDURE — 99214 OFFICE O/P EST MOD 30 MIN: CPT | Mod: 25

## 2025-02-10 ENCOUNTER — APPOINTMENT (OUTPATIENT)
Dept: ORTHOPEDIC SURGERY | Facility: CLINIC | Age: 70
End: 2025-02-10

## 2025-02-10 DIAGNOSIS — M17.0 BILATERAL PRIMARY OSTEOARTHRITIS OF KNEE: ICD-10-CM

## 2025-02-10 PROCEDURE — 20610 DRAIN/INJ JOINT/BURSA W/O US: CPT | Mod: 50

## 2025-05-02 ENCOUNTER — APPOINTMENT (OUTPATIENT)
Dept: ORTHOPEDIC SURGERY | Facility: CLINIC | Age: 70
End: 2025-05-02

## 2025-06-10 ENCOUNTER — APPOINTMENT (OUTPATIENT)
Dept: ORTHOPEDIC SURGERY | Facility: CLINIC | Age: 70
End: 2025-06-10
Payer: MEDICARE

## 2025-06-10 ENCOUNTER — RESULT REVIEW (OUTPATIENT)
Age: 70
End: 2025-06-10

## 2025-06-10 ENCOUNTER — OUTPATIENT (OUTPATIENT)
Dept: OUTPATIENT SERVICES | Facility: HOSPITAL | Age: 70
LOS: 1 days | End: 2025-06-10
Payer: MEDICARE

## 2025-06-10 VITALS
SYSTOLIC BLOOD PRESSURE: 135 MMHG | HEART RATE: 71 BPM | TEMPERATURE: 98 F | HEIGHT: 62 IN | DIASTOLIC BLOOD PRESSURE: 82 MMHG | OXYGEN SATURATION: 96 % | WEIGHT: 140 LBS | BODY MASS INDEX: 25.76 KG/M2

## 2025-06-10 DIAGNOSIS — Z98.89 OTHER SPECIFIED POSTPROCEDURAL STATES: Chronic | ICD-10-CM

## 2025-06-10 DIAGNOSIS — Z98.890 OTHER SPECIFIED POSTPROCEDURAL STATES: Chronic | ICD-10-CM

## 2025-06-10 DIAGNOSIS — Q30.9 CONGENITAL MALFORMATION OF NOSE, UNSPECIFIED: Chronic | ICD-10-CM

## 2025-06-10 DIAGNOSIS — Z95.5 PRESENCE OF CORONARY ANGIOPLASTY IMPLANT AND GRAFT: Chronic | ICD-10-CM

## 2025-06-10 PROCEDURE — 73521 X-RAY EXAM HIPS BI 2 VIEWS: CPT

## 2025-06-10 PROCEDURE — 20610 DRAIN/INJ JOINT/BURSA W/O US: CPT | Mod: LT

## 2025-06-10 PROCEDURE — 73521 X-RAY EXAM HIPS BI 2 VIEWS: CPT | Mod: 26

## 2025-06-10 PROCEDURE — 99214 OFFICE O/P EST MOD 30 MIN: CPT | Mod: 25

## 2025-06-18 ENCOUNTER — NON-APPOINTMENT (OUTPATIENT)
Age: 70
End: 2025-06-18